# Patient Record
Sex: MALE | Race: BLACK OR AFRICAN AMERICAN
[De-identification: names, ages, dates, MRNs, and addresses within clinical notes are randomized per-mention and may not be internally consistent; named-entity substitution may affect disease eponyms.]

---

## 2024-01-15 ENCOUNTER — APPOINTMENT (OUTPATIENT)
Dept: MRI IMAGING | Facility: CLINIC | Age: 74
End: 2024-01-15
Payer: MEDICARE

## 2024-01-15 ENCOUNTER — APPOINTMENT (OUTPATIENT)
Dept: ORTHOPEDIC SURGERY | Facility: CLINIC | Age: 74
End: 2024-01-15
Payer: MEDICARE

## 2024-01-15 VITALS — WEIGHT: 260 LBS | HEIGHT: 75 IN | BODY MASS INDEX: 32.33 KG/M2

## 2024-01-15 DIAGNOSIS — Z78.9 OTHER SPECIFIED HEALTH STATUS: ICD-10-CM

## 2024-01-15 PROBLEM — Z00.00 ENCOUNTER FOR PREVENTIVE HEALTH EXAMINATION: Status: ACTIVE | Noted: 2024-01-15

## 2024-01-15 PROCEDURE — 72100 X-RAY EXAM L-S SPINE 2/3 VWS: CPT

## 2024-01-15 PROCEDURE — 99203 OFFICE O/P NEW LOW 30 MIN: CPT

## 2024-01-15 PROCEDURE — 72148 MRI LUMBAR SPINE W/O DYE: CPT | Mod: MH

## 2024-01-15 PROCEDURE — 73502 X-RAY EXAM HIP UNI 2-3 VIEWS: CPT

## 2024-01-15 NOTE — ASSESSMENT
[FreeTextEntry1] : Lumbosacral degenerative disc disease with radiculopathy Left hip osteoarthritis  Plan the patient can use  Advil with GI precautions Tylenol and heating pad.  I am also recommend a course of physical therapy.  I also discussed with the patient this time about an MRI of his lumbar spine he does have evidence of sciatic type complaints.  I did advise him to start the physical therapy has been doing it and he can continue it.  I also advised him at this time he should follow-up with pain management after the MRI for possible injection.  He does have pain in his hip and is limping on exam I did advise him some that is coming from his arthritis in the hip he may be a candidate for an injection in the future.  At this point his pain seems to be in his leg coming from his low back.  Symptoms of Cauda equina were discussed with the patient. Patient was advised if they develop any numbness or tingling down the legs, weakness, or bowel or bladder symptoms they must call the office or go to the ER immediately.  This medical record was created utilizing Dragon voice recognition software.  This software is not perfect and may occasionally create typographical errors which may be reflected in the above medical record.

## 2024-01-15 NOTE — IMAGING
[de-identified] : He is alert and oriented vital signs are stable.  He is able to stand on his toes and stand on his heels  Examination lumbar sacral spine reveals some tenderness in the left paraspinal region buttocks region with no evidence for spasm.  He is able to forward flex to 50 degrees extension of the spine of 15 degrees lateral rotation to the left and right of 20 degrees and lateral flexion to right left to 20 degrees.  He had 5-5 strength bilateral lower extremities 2+ deep tendon reflexes throughout negative straight leg raising  Examination left hip reveals no tenderness around the hip and groin region.  There is restricted range of motion on flexion and internal/external rotation.  He had a normal neurologic exam normal vascular exam normal skin exam.  There is no evidence of open wounds infection or compartment syndrome. [FreeTextEntry1] : X-rays lumbar spine reveals disc base narrowing noted L4-5 and L5-S1 with spondylolisthesis of L4 and L5. [de-identified] : X-rays left hip reveal severe osteoarthritis of the left hip with bone-on-bone.  He has some mild osteoarthritis of the right hip
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
## 2024-01-22 ENCOUNTER — APPOINTMENT (OUTPATIENT)
Dept: ORTHOPEDIC SURGERY | Facility: CLINIC | Age: 74
End: 2024-01-22
Payer: MEDICARE

## 2024-01-22 DIAGNOSIS — M47.26 OTHER SPONDYLOSIS WITH RADICULOPATHY, LUMBAR REGION: ICD-10-CM

## 2024-01-22 PROCEDURE — 99213 OFFICE O/P EST LOW 20 MIN: CPT
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
## 2024-01-22 NOTE — ASSESSMENT
[FreeTextEntry1] : Lumbosacral degenerative disc disease/HNP with radiculopathy Left hip osteoarthritis  Plan the patient can use  Advil with GI precautions Tylenol and heating pad.  I am also recommend a course of physical therapy.    The MRI was reviewed with the patient.  He is given a copy the report.  He does have evidence for herniated disks causing the numbness in his left leg.  At this point recommending he do the physical therapy but also see pain management for consultation.  He will see the pain manage specialist about a possible epidural injection.  He says he has an appointment on January 29 in [place] with the pain management specialist.  For the left hip he does have arthritis in the left hip and recommend he see one of the hip specialist Dr. Prather or Dr. Walker for further consultation.  All questions were answered.  Symptoms of Cauda equina were discussed with the patient. Patient was advised if they develop any numbness or tingling down the legs, weakness, or bowel or bladder symptoms they must call the office or go to the ER immediately.  This medical record was created utilizing Dragon voice recognition software.  This software is not perfect and may occasionally create typographical errors which may be reflected in the above medical record.

## 2024-01-22 NOTE — IMAGING
[de-identified] : He is alert and oriented vital signs are stable.  He is able to stand on his toes and stand on his heels  Examination lumbar sacral spine reveals some tenderness in the left paraspinal region buttocks region with no evidence for spasm.  He is able to forward flex to 50 degrees extension of the spine of 15 degrees lateral rotation to the left and right of 20 degrees and lateral flexion to right left to 20 degrees.  He had 5-5 strength bilateral lower extremities 2+ deep tendon reflexes throughout negative straight leg raising  Examination left hip reveals no tenderness around the hip and groin region.  There is restricted range of motion on flexion and internal/external rotation.  He had a normal neurologic exam normal vascular exam normal skin exam.  There is no evidence of open wounds infection or compartment syndrome. [FreeTextEntry1] : X-rays lumbar spine reveals disc base narrowing noted L4-5 and L5-S1 with spondylolisthesis of L4 and L5. [de-identified] : X-rays left hip reveal severe osteoarthritis of the left hip with bone-on-bone.  He has some mild osteoarthritis of the right hip

## 2024-01-22 NOTE — DATA REVIEWED
[MRI] : MRI [Lumbar Spine] : lumbar spine [Report was reviewed and noted in the chart] : The report was reviewed and noted in the chart [FreeTextEntry1] : MRI lumbar spine reveals hemangioma at L3.  There is also a herniated disc with inferior left foraminal stenosis.  The herniated disc with broad asymmetric to the left at L4-5.  There is a herniated disc also at centrally at L5-S1.  There is a stress reaction of the posterior superior L5 area.

## 2024-01-29 ENCOUNTER — APPOINTMENT (OUTPATIENT)
Dept: PAIN MANAGEMENT | Facility: CLINIC | Age: 74
End: 2024-01-29
Payer: MEDICARE

## 2024-01-29 VITALS — WEIGHT: 260 LBS | HEIGHT: 75 IN | BODY MASS INDEX: 32.33 KG/M2

## 2024-01-29 PROCEDURE — 99244 OFF/OP CNSLTJ NEW/EST MOD 40: CPT

## 2024-01-29 PROCEDURE — 99214 OFFICE O/P EST MOD 30 MIN: CPT

## 2024-01-29 NOTE — DISCUSSION/SUMMARY
[de-identified] : After discussing various treatment options with the patient including but not limited to oral medications, physical therapy, exercise modalities as well as interventional spinal injections, we have decided with the following plan:  - Continue Home exercises, stretching, activity modification, physical therapy, and conservative care. - MRI report and/or images was reviewed and discussed with the patient. - Recommend Left L4-5, L5-S1 Transforaminal Epidural Steroid Injection under fluoroscopic guidance with image. - The risks, benefits and alternatives of the proposed procedure were explained in detail with the patient. The risks outlined include but are not limited to infection, bleeding, post-dural puncture headache, nerve injury, a temporary increase in pain, failure to resolve symptoms, allergic reaction, symptom recurrence, and possible elevation of blood sugar in diabetics. All questions were answered to patient's apparent satisfaction and he/she verbalized an understanding. - Patient is presenting with acute/sub-acute radicular pain with impairment in ADLs and functionality.  The pain has not responded to conservative care including NSAID therapy and/or physical therapy.  There is no bleeding tendency, unstable medical condition, or systemic infection. - Follow up in 1-2 weeks post injection for re-evaluation.

## 2024-01-29 NOTE — PHYSICAL EXAM
[de-identified] : Constitutional; Appears well, no apparent distress Ability to communicate: Normal  Respiratory: non-labored breathing Skin: No rash noted Head: Normocephalic, atraumatic Neck: no visible thyroid enlargement Eyes: Extraocular movements intact Neurologic: Alert and oriented x3 Psychiatric: normal mood, affect and behavior [] : mildly antalgic

## 2024-01-29 NOTE — HISTORY OF PRESENT ILLNESS
[Lower back] : lower back [7] : 7 [Dull/Aching] : dull/aching [Intermittent] : intermittent [Meds] : meds [Nothing helps with pain getting better] : Nothing helps with pain getting better [Walking] : walking [FreeTextEntry1] : Initial HPI 01/29/2024: Pain started over a year ago and is on the LEFT side of the lower back and radiates into the left lateral buttock and down the left lateral thigh and lower leg to the toes described as numbness pain with associated weakness.   MRI Lumbar Spine 1/15/24 independently reviewed: L4-5 left HNP with moderate central stenosis; L5-S1 HNP, left NF stenosis and moderate/severe central stenosis.  Conservative Care: has done PT with little relief  Pain Medications: Motrin/Advil PRN  Past Injections: none Spine surgery: none  Blood thinners: none [] : no [FreeTextEntry6] : Numb [FreeTextEntry7] : rt hip, groin, right leg [FreeTextEntry8] : taking a step, putting weight

## 2024-02-08 ENCOUNTER — APPOINTMENT (OUTPATIENT)
Dept: ORTHOPEDIC SURGERY | Facility: CLINIC | Age: 74
End: 2024-02-08
Payer: MEDICARE

## 2024-02-08 VITALS — HEIGHT: 75 IN | BODY MASS INDEX: 32.33 KG/M2 | WEIGHT: 260 LBS

## 2024-02-08 DIAGNOSIS — M54.42 LUMBAGO WITH SCIATICA, LEFT SIDE: ICD-10-CM

## 2024-02-08 PROCEDURE — ZZZZZ: CPT
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
## 2024-02-13 ENCOUNTER — APPOINTMENT (OUTPATIENT)
Dept: PAIN MANAGEMENT | Facility: CLINIC | Age: 74
End: 2024-02-13

## 2024-03-05 ENCOUNTER — APPOINTMENT (OUTPATIENT)
Dept: PAIN MANAGEMENT | Facility: CLINIC | Age: 74
End: 2024-03-05
Payer: MEDICARE

## 2024-03-05 PROCEDURE — 64483 NJX AA&/STRD TFRM EPI L/S 1: CPT | Mod: LT

## 2024-03-05 PROCEDURE — J3490M: CUSTOM

## 2024-03-05 PROCEDURE — 64484 NJX AA&/STRD TFRM EPI L/S EA: CPT | Mod: 59,LT

## 2024-03-05 NOTE — PROCEDURE
[FreeTextEntry3] : Date of Service: 03/05/2024   Account: [id number]  Patient: FARA SMALL   YOB: 1950  Age: 74 year   Surgeon:                                                        Vijay Khan D.O.  Assistant:                                                        None  Pre-Operative Diagnosis:                            Lumbosacral radiculitis (M54.17)  Post-Operative Diagnosis:                          Same  Procedure:                                                     Left L4-5, L5-S1, transforaminal epidural steroid injection under fluoroscopic guidance.  Anesthesia:                                                    Local with MAC  This procedure was carried out using fluoroscopic guidance.  The risks and benefits of the procedure were discussed extensively with the patient.  The consent of the patient was obtained and the following procedure was performed. The patient was placed in the prone position on the fluoroscopic table and the lumbar area was prepped and draped in a sterile fashion. A timeout was performed with all essential staff present and the site and side were verified.  The Left L4-5, L5-S1 neural foramen were identified on left oblique "william dog" anatomical view at the 6 o'clock position using fluoroscopic guidance, and the area was marked. The overlying skin and subcutaneous structures were anesthetized using sterile technique with 1% Lidocaine.  A 22-gauge spinal needle was directed toward the inferior (6 o'clock) position of the pedicle, which formed the roof of the identified foramen.  Once in the epidural space, after negative aspiration for heme and CSF, 1cc of Omnipaque contrast was injected under live fluoroscopy to confirm epidural location and assess filling defects and rule out intravascular needle placement.   The following contrast flow and epidurogram was observed: no intravascular or intrathecal flow pattern was noted.  No blood or CSF was aspirated. Contrast spread appeared to outline the left L4 and L5 nerve roots and spread medially into the epidural space.    After this, 3 ml of a total mixture of 12 mg betamethasone, 2 ml of preservative free normal saline, and 2 ml of 0.25% bupivacaine was administered without any resistance in the epidural space at each of the two levels.   The needle was subsequently removed.  Vital signs remained normal.  Pulse oximeter was used throughout the procedure and the patient's pulse and oxygen saturation remained within normal limits.  The patient tolerated the procedure well.  There were no complications.  The patient was instructed to apply ice over the injection sites for twenty minutes every two hours for the next 24 to 48 hours.  The patient was also instructed to contact me immediately if there were any problems.  Vijay Khan D.O.

## 2024-03-18 ENCOUNTER — APPOINTMENT (OUTPATIENT)
Dept: PAIN MANAGEMENT | Facility: CLINIC | Age: 74
End: 2024-03-18
Payer: MEDICARE

## 2024-03-18 VITALS — BODY MASS INDEX: 28.62 KG/M2 | HEIGHT: 76 IN | WEIGHT: 235 LBS

## 2024-03-18 DIAGNOSIS — M16.12 UNILATERAL PRIMARY OSTEOARTHRITIS, LEFT HIP: ICD-10-CM

## 2024-03-18 PROCEDURE — 99213 OFFICE O/P EST LOW 20 MIN: CPT

## 2024-03-18 NOTE — HISTORY OF PRESENT ILLNESS
[Lower back] : lower back [Dull/Aching] : dull/aching [Intermittent] : intermittent [Meds] : meds [Nothing helps with pain getting better] : Nothing helps with pain getting better [Walking] : walking [6] : 6 [FreeTextEntry1] : 03/18/2024: s/p Left L4-5 L5-S1 TFESI on 03/05/24 with 50% relief and improvement of ADLS. Pain still radiating down the leg but much less severe. Reports less weakness, but still experiencing some pain and numbness.  Saw Dr. Walker who recommended LEFT hip injection. Hip X-rays reviewed showing advanced OA.   Initial HPI 01/29/2024: Pain started over a year ago and is on the LEFT side of the lower back and radiates into the left lateral buttock and down the left lateral thigh and lower leg to the toes described as numbness pain with associated weakness.   MRI Lumbar Spine 1/15/24 independently reviewed: L4-5 left HNP with moderate central stenosis; L5-S1 HNP, left NF stenosis and moderate/severe central stenosis.  Conservative Care: has done PT with little relief  Pain Medications: Motrin/Advil PRN  Past Injections: none Spine surgery: none  Blood thinners: none [FreeTextEntry6] : Numb [] : no [FreeTextEntry8] : taking a step, putting weight  [FreeTextEntry7] : rt hip, groin, right leg

## 2024-03-18 NOTE — DISCUSSION/SUMMARY
[de-identified] : After discussing various treatment options with the patient including but not limited to oral medications, physical therapy, exercise modalities as well as interventional spinal injections, we have decided with the following plan:  - Continue Home exercises, stretching, activity modification, physical therapy, and conservative care. - Recommend LEFT hip injection under fluoroscopic guidance with image. - The risks, benefits and alternatives of the proposed procedure were explained in detail with the patient. The risks outlined include but are not limited to infection, bleeding, nerve injury, a temporary increase in pain, failure to resolve symptoms, allergic reaction, symptom recurrence, and possible elevation of blood sugar in diabetics. All questions were answered to patient's apparent satisfaction and he/she verbalized an understanding. - The pain has not responded to conservative care including NSAID therapy and/or physical therapy.  There is no bleeding tendency, unstable medical condition, or systemic infection. - Follow up in 1-2 weeks post injection for re-evaluation.

## 2024-03-18 NOTE — PHYSICAL EXAM
[de-identified] : Constitutional; Appears well, no apparent distress Ability to communicate: Normal  Respiratory: non-labored breathing Skin: No rash noted Head: Normocephalic, atraumatic Neck: no visible thyroid enlargement Eyes: Extraocular movements intact Neurologic: Alert and oriented x3 Psychiatric: normal mood, affect and behavior Left Hip: +FADIR  [] : negative sitting straight leg raise

## 2024-04-02 ENCOUNTER — APPOINTMENT (OUTPATIENT)
Dept: PAIN MANAGEMENT | Facility: CLINIC | Age: 74
End: 2024-04-02

## 2024-04-18 ENCOUNTER — APPOINTMENT (OUTPATIENT)
Dept: PAIN MANAGEMENT | Facility: CLINIC | Age: 74
End: 2024-04-18

## 2024-05-07 ENCOUNTER — APPOINTMENT (OUTPATIENT)
Dept: PAIN MANAGEMENT | Facility: CLINIC | Age: 74
End: 2024-05-07
Payer: MEDICARE

## 2024-05-07 PROCEDURE — 77002 NEEDLE LOCALIZATION BY XRAY: CPT | Mod: 59

## 2024-05-07 PROCEDURE — J3490M: CUSTOM

## 2024-05-07 PROCEDURE — 27093 INJECTION FOR HIP X-RAY: CPT | Mod: LT

## 2024-05-07 PROCEDURE — 73525 CONTRAST X-RAY OF HIP: CPT | Mod: 26,59

## 2024-05-07 NOTE — PROCEDURE
[FreeTextEntry3] : Date of Service: 05/07/2024   Account: [id number]  Patient: FARA SMALL   YOB: 1950  Age: 74 year   Surgeon:                                                          Vijay Khan D.O.   Pre-Operative Diagnosis:                              Unilateral primary osteoarthritis, left hip  Post-Operative Diagnosis:                            Same  Procedure:                                                       Left Hip arthrogram and steroid injection under fluoroscopic guidance                                                 This procedure was carried out using fluoroscopic guidance.  The risks and benefits of the procedure were discussed extensively with the patient.  The consent of the patient was obtained and the following procedure was performed.  The patient was placed in the supine position with left hip flexed and externally rotated 25 degrees.  The area of the left groin was prepped and draped in a sterile fashion. A timeout was performed with all essential staff present and the site and side were verified. The fluoroscopic image intensifier was then positioned so that the left hip appeared in view, and the midline intertrochanteric region was identified and marked. The skin and subcutaneous structures were then anesthetized using 1 cc of 1% lidocaine.  A 22-gauge spinal needle was then inserted and directed into this left hip intra-capsular region.  After negative aspiration for heme and CSF, 3 cc of Omnipaque contrast was injected under live fluoroscopy and appeared to fill the joint margins.   Left hip arthrogram showed no intravascular flow, and good spread around the femoral head and to the acetabulum. An injectate of 3 cc 0.25% bupivacaine plus 6 mg of betamethasone was then injected into the left hip space.  The needle was then removed and pressure was applied.  Anesthesia personnel were present throughout the procedure. The patient was instructed to apply ice over the injection site for twenty minutes every two hours for the next 24 to 48 hours.  The patient was also instructed to contact me immediately if there were any problems.  Vijay Khan D.O.

## 2024-05-20 ENCOUNTER — APPOINTMENT (OUTPATIENT)
Dept: PAIN MANAGEMENT | Facility: CLINIC | Age: 74
End: 2024-05-20
Payer: MEDICARE

## 2024-05-20 VITALS — BODY MASS INDEX: 29.22 KG/M2 | HEIGHT: 75 IN | WEIGHT: 235 LBS

## 2024-05-20 DIAGNOSIS — M16.12 UNILATERAL PRIMARY OSTEOARTHRITIS, LEFT HIP: ICD-10-CM

## 2024-05-20 PROCEDURE — 99213 OFFICE O/P EST LOW 20 MIN: CPT

## 2024-05-20 NOTE — DISCUSSION/SUMMARY
[de-identified] : After discussing various treatment options with the patient including but not limited to oral medications, physical therapy, exercise modalities as well as interventional spinal injections, we have decided with the following plan:  - Continue Home exercises, stretching, activity modification, physical therapy, and conservative care. - MRI report and/or images was reviewed and discussed with the patient. - Recommend L4-5 Lumbar Epidural Steroid Injection under fluoroscopic guidance with image. - The risks, benefits and alternatives of the proposed procedure were explained in detail with the patient. The risks outlined include but are not limited to infection, bleeding, post-dural puncture headache, nerve injury, a temporary increase in pain, failure to resolve symptoms, allergic reaction, symptom recurrence, and possible elevation of blood sugar in diabetics. All questions were answered to patient's apparent satisfaction and he/she verbalized an understanding. - Patient is presenting with acute/sub-acute radicular pain with impairment in ADLs and functionality.  The pain has not responded to conservative care including NSAID therapy and/or physical therapy.  There is no bleeding tendency, unstable medical condition, or systemic infection. - Follow up in 1-2 weeks post injection for re-evaluation.

## 2024-05-20 NOTE — PHYSICAL EXAM
[de-identified] : Constitutional; Appears well, no apparent distress Ability to communicate: Normal  Respiratory: non-labored breathing Skin: No rash noted Head: Normocephalic, atraumatic Neck: no visible thyroid enlargement Eyes: Extraocular movements intact Neurologic: Alert and oriented x3 Psychiatric: normal mood, affect and behavior Left Hip: +FADIR  [] : negative sitting straight leg raise

## 2024-05-20 NOTE — HISTORY OF PRESENT ILLNESS
[Lower back] : lower back [6] : 6 [Dull/Aching] : dull/aching [Intermittent] : intermittent [Meds] : meds [Nothing helps with pain getting better] : Nothing helps with pain getting better [Walking] : walking [FreeTextEntry1] : 05/20/2024: s/p LT HIP INJ on 05/07/24 with 50% relief and improvement of ADLs. Able to walk better but still needs a cane. Pain radiating down to the knee and lower leg to the ankle.   03/18/2024: s/p Left L4-5 L5-S1 TFESI on 03/05/24 with 50% relief and improvement of ADLS. Pain still radiating down the leg but much less severe. Reports less weakness, but still experiencing some pain and numbness.  Saw Dr. Walker who recommended LEFT hip injection. Hip X-rays reviewed showing advanced OA.   Initial HPI 01/29/2024: Pain started over a year ago and is on the LEFT side of the lower back and radiates into the left lateral buttock and down the left lateral thigh and lower leg to the toes described as numbness pain with associated weakness.   MRI Lumbar Spine 1/15/24 independently reviewed: L4-5 left HNP with moderate central stenosis; L5-S1 HNP, left NF stenosis and moderate/severe central stenosis.  Conservative Care: has done PT with little relief  Pain Medications: Motrin/Advil PRN  Past Injections: none Spine surgery: none  Blood thinners: none [] : no [FreeTextEntry6] : Numb [FreeTextEntry7] : rt hip, groin, right leg [FreeTextEntry8] : taking a step, putting weight  [TWNoteComboBox1] : 50%

## 2024-06-04 ENCOUNTER — APPOINTMENT (OUTPATIENT)
Dept: PAIN MANAGEMENT | Facility: CLINIC | Age: 74
End: 2024-06-04
Payer: MEDICARE

## 2024-06-04 PROCEDURE — 62323 NJX INTERLAMINAR LMBR/SAC: CPT

## 2024-06-04 NOTE — PROCEDURE
[FreeTextEntry3] : Date of Service: 06/04/2024   Account: [id number]  Patient: FARA SMALL   YOB: 1950  Age: 74 year   Surgeon:                                                         Vijay Khan D.O.  Pre-Operative Diagnosis:                             Lumbosacral radiculitis  Post-Operative Diagnosis:                           Same  Procedure:                                                      Interlaminar lumbar epidural steroid injection (L4-5) under fluoroscopic guidance  Anesthesia:                                                     Local with MAC   This procedure was carried out using fluoroscopic guidance.  The risks and benefits of the procedure were discussed extensively with the patient.  The consent of the patient was obtained and the following procedure was performed.  The patient was placed in the prone position.  The lumbar area was prepped and draped in a sterile fashion.  A timeout was performed with all essential staff present and the site and side were verified. Under AP view with slight cephalad-caudad angulation, the L4-5 interspace was identified and marked.  Using sterile technique, the superficial skin was anesthetized with 1% Lidocaine without epinephrine.  A 20-gauge Tuohy needle was advanced into the epidural space under fluoroscopy using [id number] technique and using loss of resistance at the L4-5 level.  After negative aspiration for heme or CSF, an epidurogram was obtained using 2-3 cc Omnipaque contrast injected under live fluoroscopy, confirming epidural placement of the needle.    Epidurogram showed no evidence of intrathecal or intravascular flow, and good evidence of bilateral epidural flow from L3-S2 levels.  After this, 4 cc of preservative free normal saline plus 12 mg of betamethasone were injected into the epidural space.  The needle was subsequently removed.  Anesthesia personnel were present throughout the procedure.  The patient tolerated the procedure well and was instructed to contact me immediately if there were any problems.  Vijay Khan D.O.

## 2024-06-17 ENCOUNTER — APPOINTMENT (OUTPATIENT)
Dept: PAIN MANAGEMENT | Facility: CLINIC | Age: 74
End: 2024-06-17
Payer: MEDICARE

## 2024-06-17 VITALS — WEIGHT: 235 LBS | BODY MASS INDEX: 29.22 KG/M2 | HEIGHT: 75 IN

## 2024-06-17 DIAGNOSIS — M54.50 LOW BACK PAIN, UNSPECIFIED: ICD-10-CM

## 2024-06-17 DIAGNOSIS — M54.17 RADICULOPATHY, LUMBOSACRAL REGION: ICD-10-CM

## 2024-06-17 PROCEDURE — 99213 OFFICE O/P EST LOW 20 MIN: CPT

## 2024-06-17 RX ORDER — MELOXICAM 15 MG/1
15 TABLET ORAL
Qty: 30 | Refills: 1 | Status: ACTIVE | COMMUNITY
Start: 2024-06-17 | End: 1900-01-01

## 2024-06-17 NOTE — DISCUSSION/SUMMARY
[de-identified] : After discussing various treatment options with the patient including but not limited to oral medications, physical therapy, exercise modalities as well as interventional spinal injections, we have decided with the following plan:  - Continue home exercises, stretching, activity modification, physical therapy, and conservative care. - Follow-up as needed. - Recommend Meloxicam 15mg daily PRN. Potential adverse effects including but not limited to bleeding, ulcers, increased risk of hypertension, heart disease, kidney disease and stroke were discussed with the patient.  Medication would allow patient to be more mobile and perform ADL's.  Will continue to monitor patient and attempt to wean as soon as possible. Will use the lowest dosage possible for the shortest possible period of time.

## 2024-06-17 NOTE — PHYSICAL EXAM
[de-identified] : Constitutional; Appears well, no apparent distress Ability to communicate: Normal  Respiratory: non-labored breathing Skin: No rash noted Head: Normocephalic, atraumatic Neck: no visible thyroid enlargement Eyes: Extraocular movements intact Neurologic: Alert and oriented x3 Psychiatric: normal mood, affect and behavior Left Hip: +FADIR  [] : negative sitting straight leg raise

## 2024-06-17 NOTE — HISTORY OF PRESENT ILLNESS
[Lower back] : lower back [6] : 6 [Dull/Aching] : dull/aching [Intermittent] : intermittent [Meds] : meds [Nothing helps with pain getting better] : Nothing helps with pain getting better [Walking] : walking [FreeTextEntry1] : 06/17/2024: s/p L4-5 LESI on 06/04/24 with 50% relief and improvement of ADLs. Pain was good for a few days and then started to return.   05/20/2024: s/p LT HIP INJ on 05/07/24 with 50% relief and improvement of ADLs. Able to walk better but still needs a cane. Pain radiating down to the knee and lower leg to the ankle.   03/18/2024: s/p Left L4-5 L5-S1 TFESI on 03/05/24 with 50% relief and improvement of ADLS. Pain still radiating down the leg but much less severe. Reports less weakness, but still experiencing some pain and numbness.  Saw Dr. Walker who recommended LEFT hip injection. Hip X-rays reviewed showing advanced OA.   Initial HPI 01/29/2024: Pain started over a year ago and is on the LEFT side of the lower back and radiates into the left lateral buttock and down the left lateral thigh and lower leg to the toes described as numbness pain with associated weakness.   MRI Lumbar Spine 1/15/24 independently reviewed: L4-5 left HNP with moderate central stenosis; L5-S1 HNP, left NF stenosis and moderate/severe central stenosis.  Conservative Care: has done PT with little relief  Pain Medications: Motrin/Advil PRN  Past Injections: none Spine surgery: none  Blood thinners: none occupation:   [] : no [FreeTextEntry6] : Numb [FreeTextEntry7] : rt hip, groin, right leg [FreeTextEntry8] : taking a step, putting weight

## 2024-09-17 ENCOUNTER — APPOINTMENT (OUTPATIENT)
Dept: PAIN MANAGEMENT | Facility: CLINIC | Age: 74
End: 2024-09-17
Payer: MEDICARE

## 2024-09-17 VITALS — BODY MASS INDEX: 29.22 KG/M2 | WEIGHT: 235 LBS | HEIGHT: 75 IN

## 2024-09-17 DIAGNOSIS — M54.17 RADICULOPATHY, LUMBOSACRAL REGION: ICD-10-CM

## 2024-09-17 DIAGNOSIS — M54.50 LOW BACK PAIN, UNSPECIFIED: ICD-10-CM

## 2024-09-17 PROCEDURE — 99213 OFFICE O/P EST LOW 20 MIN: CPT

## 2024-09-17 NOTE — DISCUSSION/SUMMARY
[de-identified] : After discussing various treatment options with the patient including but not limited to oral medications, physical therapy, exercise modalities as well as interventional spinal injections, we have decided with the following plan:  - Continue Home exercises, stretching, activity modification, physical therapy, and conservative care. - MRI report and/or images was reviewed and discussed with the patient. - Recommend Left L4-5, L5-S1 Transforaminal Epidural Steroid Injection under fluoroscopic guidance with image.  - Recommend LEFT hip injection under fluoroscopic guidance with image. - The risks, benefits and alternatives of the proposed procedure were explained in detail with the patient. The risks outlined include but are not limited to infection, bleeding, post-dural puncture headache, nerve injury, a temporary increase in pain, failure to resolve symptoms, allergic reaction, symptom recurrence, and possible elevation of blood sugar in diabetics. All questions were answered to patient's apparent satisfaction and he/she verbalized an understanding. - Patient is presenting with acute/sub-acute radicular pain with impairment in ADLs and functionality.  The pain has not responded to conservative care including NSAID therapy and/or physical therapy.  There is no bleeding tendency, unstable medical condition, or systemic infection. - Follow up in 1-2 weeks post injection for re-evaluation.

## 2024-09-17 NOTE — HISTORY OF PRESENT ILLNESS
[Lower back] : lower back [Left Leg] : left leg [6] : 6 [Dull/Aching] : dull/aching [Intermittent] : intermittent [Meds] : meds [Nothing helps with pain getting better] : Nothing helps with pain getting better [Walking] : walking [] : no [FreeTextEntry6] : Numb [FreeTextEntry7] : rt hip, groin, right leg [FreeTextEntry8] : taking a step, putting weight  [FreeTextEntry1] : 9/17/24: Pain is on the left buttock and lateral hip and radiating into the left groin and down the leg to the ankle.   06/17/2024: s/p L4-5 LESI on 06/04/24 with 50% relief and improvement of ADLs. Pain was good for a few days and then started to return.   05/20/2024: s/p LT HIP INJ on 05/07/24 with 50% relief and improvement of ADLs. Able to walk better but still needs a cane. Pain radiating down to the knee and lower leg to the ankle.   03/18/2024: s/p Left L4-5 L5-S1 TFESI on 03/05/24 with 50% relief and improvement of ADLS. Pain still radiating down the leg but much less severe. Reports less weakness, but still experiencing some pain and numbness.  Saw Dr. Walker who recommended LEFT hip injection. Hip X-rays reviewed showing advanced OA.   Initial HPI 01/29/2024: Pain started over a year ago and is on the LEFT side of the lower back and radiates into the left lateral buttock and down the left lateral thigh and lower leg to the toes described as numbness pain with associated weakness.   MRI Lumbar Spine 1/15/24 independently reviewed: L4-5 left HNP with moderate central stenosis; L5-S1 HNP, left NF stenosis and moderate/severe central stenosis.  Conservative Care: has done PT with little relief  Pain Medications: Motrin/Advil PRN  Past Injections: none Spine surgery: none  Blood thinners: none occupation:

## 2024-09-17 NOTE — PHYSICAL EXAM
[de-identified] : Constitutional; Appears well, no apparent distress Ability to communicate: Normal  Respiratory: non-labored breathing Skin: No rash noted Head: Normocephalic, atraumatic Neck: no visible thyroid enlargement Eyes: Extraocular movements intact Neurologic: Alert and oriented x3 Psychiatric: normal mood, affect and behavior Left Hip: +FADIR  [] : negative sitting straight leg raise

## 2024-10-01 ENCOUNTER — APPOINTMENT (OUTPATIENT)
Dept: PAIN MANAGEMENT | Facility: CLINIC | Age: 74
End: 2024-10-01
Payer: MEDICARE

## 2024-10-01 DIAGNOSIS — M54.17 RADICULOPATHY, LUMBOSACRAL REGION: ICD-10-CM

## 2024-10-01 PROCEDURE — 64483 NJX AA&/STRD TFRM EPI L/S 1: CPT | Mod: LT

## 2024-10-01 PROCEDURE — 64484 NJX AA&/STRD TFRM EPI L/S EA: CPT | Mod: LT,59

## 2024-10-01 PROCEDURE — J3490M: CUSTOM

## 2024-10-01 NOTE — PROCEDURE
[FreeTextEntry3] : Date of Service: 10/01/2024   Account: [id number]  Patient: FARA SMALL   YOB: 1950  Age: 74 year   Surgeon:                                                        Vijay Khan D.O.  Assistant:                                                        None  Pre-Operative Diagnosis:                            Lumbosacral radiculitis (M54.17)  Post-Operative Diagnosis:                          Same  Procedure:                                                     Left L4-5, L5-S1, transforaminal epidural steroid injection under fluoroscopic guidance.  Anesthesia:                                                    Local with MAC  This procedure was carried out using fluoroscopic guidance.  The risks and benefits of the procedure were discussed extensively with the patient.  The consent of the patient was obtained and the following procedure was performed. The patient was placed in the prone position on the fluoroscopic table and the lumbar area was prepped and draped in a sterile fashion. A timeout was performed with all essential staff present and the site and side were verified.  The Left L4-5, L5-S1 neural foramen were identified on left oblique "william dog" anatomical view at the 6 o'clock position using fluoroscopic guidance, and the area was marked. The overlying skin and subcutaneous structures were anesthetized using sterile technique with 1% Lidocaine.  A 22-gauge spinal needle was directed toward the inferior (6 o'clock) position of the pedicle, which formed the roof of the identified foramen.  Once in the epidural space, after negative aspiration for heme and CSF, 1cc of Omnipaque contrast was injected under live fluoroscopy to confirm epidural location and assess filling defects and rule out intravascular needle placement.   The following contrast flow and epidurogram was observed: no intravascular or intrathecal flow pattern was noted.  No blood or CSF was aspirated. Contrast spread appeared to outline the left L4 and L5 nerve roots and spread medially into the epidural space.    After this, 3 ml of a total mixture of 12 mg betamethasone, 2 ml of preservative free normal saline, and 2 ml of 0.25% bupivacaine was administered without any resistance in the epidural space at each of the two levels.   The needle was subsequently removed.  Vital signs remained normal.  Pulse oximeter was used throughout the procedure and the patient's pulse and oxygen saturation remained within normal limits.  The patient tolerated the procedure well.  There were no complications.  The patient was instructed to apply ice over the injection sites for twenty minutes every two hours for the next 24 to 48 hours.  The patient was also instructed to contact me immediately if there were any problems.  Vijay Khan D.O.

## 2024-10-08 ENCOUNTER — APPOINTMENT (OUTPATIENT)
Dept: PAIN MANAGEMENT | Facility: CLINIC | Age: 74
End: 2024-10-08
Payer: MEDICARE

## 2024-10-08 DIAGNOSIS — M16.12 UNILATERAL PRIMARY OSTEOARTHRITIS, LEFT HIP: ICD-10-CM

## 2024-10-08 PROCEDURE — 73525 CONTRAST X-RAY OF HIP: CPT | Mod: 26,59

## 2024-10-08 PROCEDURE — 27093 INJECTION FOR HIP X-RAY: CPT | Mod: LT

## 2024-10-08 PROCEDURE — 77002 NEEDLE LOCALIZATION BY XRAY: CPT | Mod: 59

## 2024-10-08 PROCEDURE — J3490M: CUSTOM

## 2024-10-21 ENCOUNTER — APPOINTMENT (OUTPATIENT)
Dept: PAIN MANAGEMENT | Facility: CLINIC | Age: 74
End: 2024-10-21
Payer: MEDICARE

## 2024-10-21 VITALS — HEIGHT: 75 IN | WEIGHT: 237 LBS | BODY MASS INDEX: 29.47 KG/M2

## 2024-10-21 DIAGNOSIS — M54.50 LOW BACK PAIN, UNSPECIFIED: ICD-10-CM

## 2024-10-21 DIAGNOSIS — M54.17 RADICULOPATHY, LUMBOSACRAL REGION: ICD-10-CM

## 2024-10-21 PROCEDURE — 99213 OFFICE O/P EST LOW 20 MIN: CPT

## 2024-11-12 ENCOUNTER — APPOINTMENT (OUTPATIENT)
Dept: ORTHOPEDIC SURGERY | Facility: CLINIC | Age: 74
End: 2024-11-12
Payer: MEDICARE

## 2024-11-12 VITALS — WEIGHT: 245 LBS | BODY MASS INDEX: 30.15 KG/M2 | HEIGHT: 75.5 IN

## 2024-11-12 DIAGNOSIS — M16.12 UNILATERAL PRIMARY OSTEOARTHRITIS, LEFT HIP: ICD-10-CM

## 2024-11-12 DIAGNOSIS — Z86.79 PERSONAL HISTORY OF OTHER DISEASES OF THE CIRCULATORY SYSTEM: ICD-10-CM

## 2024-11-12 PROCEDURE — 99214 OFFICE O/P EST MOD 30 MIN: CPT

## 2024-11-12 PROCEDURE — 73503 X-RAY EXAM HIP UNI 4/> VIEWS: CPT | Mod: LT

## 2024-11-12 PROCEDURE — G2211 COMPLEX E/M VISIT ADD ON: CPT

## 2024-11-12 RX ORDER — MELOXICAM 15 MG/1
15 TABLET ORAL
Qty: 30 | Refills: 2 | Status: ACTIVE | COMMUNITY
Start: 2024-11-12 | End: 1900-01-01

## 2024-11-21 ENCOUNTER — NON-APPOINTMENT (OUTPATIENT)
Age: 74
End: 2024-11-21

## 2024-12-26 ENCOUNTER — OUTPATIENT (OUTPATIENT)
Dept: OUTPATIENT SERVICES | Facility: HOSPITAL | Age: 74
LOS: 1 days | End: 2024-12-26
Payer: COMMERCIAL

## 2024-12-26 VITALS
OXYGEN SATURATION: 98 % | HEIGHT: 75 IN | HEART RATE: 70 BPM | DIASTOLIC BLOOD PRESSURE: 88 MMHG | TEMPERATURE: 98 F | SYSTOLIC BLOOD PRESSURE: 150 MMHG | RESPIRATION RATE: 18 BRPM | WEIGHT: 265 LBS

## 2024-12-26 DIAGNOSIS — M16.12 UNILATERAL PRIMARY OSTEOARTHRITIS, LEFT HIP: ICD-10-CM

## 2024-12-26 DIAGNOSIS — Z01.818 ENCOUNTER FOR OTHER PREPROCEDURAL EXAMINATION: ICD-10-CM

## 2024-12-26 LAB
A1C WITH ESTIMATED AVERAGE GLUCOSE RESULT: 5.7 % — HIGH (ref 4–5.6)
ALBUMIN SERPL ELPH-MCNC: 3.9 G/DL — SIGNIFICANT CHANGE UP (ref 3.3–5)
ALP SERPL-CCNC: 93 U/L — SIGNIFICANT CHANGE UP (ref 30–120)
ALT FLD-CCNC: 23 U/L — SIGNIFICANT CHANGE UP (ref 10–60)
ANION GAP SERPL CALC-SCNC: 8 MMOL/L — SIGNIFICANT CHANGE UP (ref 5–17)
APTT BLD: 30.9 SEC — SIGNIFICANT CHANGE UP (ref 24.5–35.6)
AST SERPL-CCNC: 19 U/L — SIGNIFICANT CHANGE UP (ref 10–40)
BILIRUB SERPL-MCNC: 0.6 MG/DL — SIGNIFICANT CHANGE UP (ref 0.2–1.2)
BLD GP AB SCN SERPL QL: SIGNIFICANT CHANGE UP
BUN SERPL-MCNC: 16 MG/DL — SIGNIFICANT CHANGE UP (ref 7–23)
CALCIUM SERPL-MCNC: 9 MG/DL — SIGNIFICANT CHANGE UP (ref 8.4–10.5)
CHLORIDE SERPL-SCNC: 104 MMOL/L — SIGNIFICANT CHANGE UP (ref 96–108)
CO2 SERPL-SCNC: 28 MMOL/L — SIGNIFICANT CHANGE UP (ref 22–31)
CREAT SERPL-MCNC: 1.27 MG/DL — SIGNIFICANT CHANGE UP (ref 0.5–1.3)
EGFR: 60 ML/MIN/1.73M2 — SIGNIFICANT CHANGE UP
ESTIMATED AVERAGE GLUCOSE: 117 MG/DL — HIGH (ref 68–114)
GLUCOSE SERPL-MCNC: 94 MG/DL — SIGNIFICANT CHANGE UP (ref 70–99)
HCT VFR BLD CALC: 39.5 % — SIGNIFICANT CHANGE UP (ref 39–50)
HGB BLD-MCNC: 12.5 G/DL — LOW (ref 13–17)
INR BLD: 1.06 RATIO — SIGNIFICANT CHANGE UP (ref 0.85–1.16)
MCHC RBC-ENTMCNC: 28.7 PG — SIGNIFICANT CHANGE UP (ref 27–34)
MCHC RBC-ENTMCNC: 31.6 G/DL — LOW (ref 32–36)
MCV RBC AUTO: 90.8 FL — SIGNIFICANT CHANGE UP (ref 80–100)
NRBC # BLD: 0 /100 WBCS — SIGNIFICANT CHANGE UP (ref 0–0)
PLATELET # BLD AUTO: 191 K/UL — SIGNIFICANT CHANGE UP (ref 150–400)
POTASSIUM SERPL-MCNC: 3.8 MMOL/L — SIGNIFICANT CHANGE UP (ref 3.5–5.3)
POTASSIUM SERPL-SCNC: 3.8 MMOL/L — SIGNIFICANT CHANGE UP (ref 3.5–5.3)
PROT SERPL-MCNC: 8.4 G/DL — HIGH (ref 6–8.3)
PROTHROM AB SERPL-ACNC: 12.3 SEC — SIGNIFICANT CHANGE UP (ref 9.9–13.4)
RBC # BLD: 4.35 M/UL — SIGNIFICANT CHANGE UP (ref 4.2–5.8)
RBC # FLD: 13.7 % — SIGNIFICANT CHANGE UP (ref 10.3–14.5)
SODIUM SERPL-SCNC: 140 MMOL/L — SIGNIFICANT CHANGE UP (ref 135–145)
WBC # BLD: 4.8 K/UL — SIGNIFICANT CHANGE UP (ref 3.8–10.5)
WBC # FLD AUTO: 4.8 K/UL — SIGNIFICANT CHANGE UP (ref 3.8–10.5)

## 2024-12-26 PROCEDURE — 36415 COLL VENOUS BLD VENIPUNCTURE: CPT

## 2024-12-26 PROCEDURE — 93005 ELECTROCARDIOGRAM TRACING: CPT

## 2024-12-26 PROCEDURE — 93010 ELECTROCARDIOGRAM REPORT: CPT

## 2024-12-26 PROCEDURE — 86900 BLOOD TYPING SEROLOGIC ABO: CPT

## 2024-12-26 PROCEDURE — 83036 HEMOGLOBIN GLYCOSYLATED A1C: CPT

## 2024-12-26 PROCEDURE — 86901 BLOOD TYPING SEROLOGIC RH(D): CPT

## 2024-12-26 PROCEDURE — 85610 PROTHROMBIN TIME: CPT

## 2024-12-26 PROCEDURE — 87640 STAPH A DNA AMP PROBE: CPT

## 2024-12-26 PROCEDURE — 80053 COMPREHEN METABOLIC PANEL: CPT

## 2024-12-26 PROCEDURE — 85027 COMPLETE CBC AUTOMATED: CPT

## 2024-12-26 PROCEDURE — 87641 MR-STAPH DNA AMP PROBE: CPT

## 2024-12-26 PROCEDURE — 86850 RBC ANTIBODY SCREEN: CPT

## 2024-12-26 PROCEDURE — G0463: CPT

## 2024-12-26 PROCEDURE — 85730 THROMBOPLASTIN TIME PARTIAL: CPT

## 2024-12-26 RX ORDER — ACETAMINOPHEN 80 MG/.8ML
2 SOLUTION/ DROPS ORAL
Refills: 0 | DISCHARGE

## 2024-12-26 RX ORDER — IBUPROFEN 200 MG
1 TABLET ORAL
Refills: 0 | DISCHARGE

## 2024-12-26 NOTE — H&P PST ADULT - NSICDXFAMILYHX_GEN_ALL_CORE_FT
FAMILY HISTORY:  Mother  Still living? No  FH: diabetic complications, Age at diagnosis: Age Unknown

## 2024-12-26 NOTE — H&P PST ADULT - NSICDXPASTMEDICALHX_GEN_ALL_CORE_FT
PAST MEDICAL HISTORY:  Allergy to metal     Back pain     Elevated blood pressure reading     H/O eczema     Lumbosacral radiculitis     Neck pain     Obese     Osteoarthritis of left hip     Seasonal allergies

## 2024-12-26 NOTE — H&P PST ADULT - NEGATIVE OPHTHALMOLOGIC SYMPTOMS
use contact lens/no photophobia/no lacrimation L/no lacrimation R/no blurred vision L/no blurred vision R

## 2024-12-26 NOTE — H&P PST ADULT - HISTORY OF PRESENT ILLNESS
73 y/o male with h/o osteoarthritis presents with  complaining of left hip  pain that started a few  years ago. He complains  left-sided low back pain going to his left buttocks and down his left leg to his left ankle Patient states he has trouble walking long distances . Patient denies that he has any numbness or tingling. Patient is scheduled for Left total hip replacement on 01/15/2025

## 2024-12-26 NOTE — H&P PST ADULT - PROBLEM SELECTOR PLAN 1
75 y/o male with left hip  pain  scheduled surgery: Left total hip replacement  will obtain medical clearance  Patient provided with pre-operative instructions and verbalized understanding.   Patient will be NPO on day of surgery.   Patient will stop NSAIDs, aspirin, herbal supplements or vitamins 1 week prior to surgery.    Chlorhexidine wash  and Gatorade provided with instructions.

## 2024-12-26 NOTE — H&P PST ADULT - COMMENTS
denies broken loose teeth  denies h/oDVT,PE  denies any current cold or flu like symptoms, including fever, cough, sinus congestion, body aches or chills

## 2024-12-26 NOTE — H&P PST ADULT - NSANTHOSAYNRD_GEN_A_CORE
neck circ 19/No. ASUNCION screening performed.  STOP BANG Legend: 0-2 = LOW Risk; 3-4 = INTERMEDIATE Risk; 5-8 = HIGH Risk

## 2024-12-27 LAB
MRSA PCR RESULT.: DETECTED
S AUREUS DNA NOSE QL NAA+PROBE: DETECTED

## 2024-12-27 RX ORDER — MUPIROCIN 2 %
1 OINTMENT (GRAM) TOPICAL
Qty: 1 | Refills: 0
Start: 2024-12-27 | End: 2024-12-31

## 2024-12-27 NOTE — PROGRESS NOTE ADULT - SUBJECTIVE AND OBJECTIVE BOX
nasal swab culture is positive   MSA, MRSA detected  Mupirocin ointment ordered and patient informed  patient verbalized understanding

## 2024-12-30 PROBLEM — M16.12 UNILATERAL PRIMARY OSTEOARTHRITIS, LEFT HIP: Chronic | Status: ACTIVE | Noted: 2024-12-26

## 2024-12-30 PROBLEM — E66.9 OBESITY, UNSPECIFIED: Chronic | Status: ACTIVE | Noted: 2024-12-26

## 2024-12-30 PROBLEM — Z87.2 PERSONAL HISTORY OF DISEASES OF THE SKIN AND SUBCUTANEOUS TISSUE: Chronic | Status: ACTIVE | Noted: 2024-12-26

## 2024-12-30 PROBLEM — J30.2 OTHER SEASONAL ALLERGIC RHINITIS: Chronic | Status: ACTIVE | Noted: 2024-12-26

## 2024-12-30 PROBLEM — R03.0 ELEVATED BLOOD-PRESSURE READING, WITHOUT DIAGNOSIS OF HYPERTENSION: Chronic | Status: ACTIVE | Noted: 2024-12-26

## 2024-12-30 PROBLEM — M54.2 CERVICALGIA: Chronic | Status: ACTIVE | Noted: 2024-12-26

## 2024-12-30 PROBLEM — M54.17 RADICULOPATHY, LUMBOSACRAL REGION: Chronic | Status: ACTIVE | Noted: 2024-12-26

## 2024-12-30 PROBLEM — Z91.09 OTHER ALLERGY STATUS, OTHER THAN TO DRUGS AND BIOLOGICAL SUBSTANCES: Chronic | Status: ACTIVE | Noted: 2024-12-26

## 2024-12-30 PROBLEM — M54.9 DORSALGIA, UNSPECIFIED: Chronic | Status: ACTIVE | Noted: 2024-12-26

## 2025-01-01 ENCOUNTER — NON-APPOINTMENT (OUTPATIENT)
Age: 75
End: 2025-01-01

## 2025-01-02 ENCOUNTER — NON-APPOINTMENT (OUTPATIENT)
Age: 75
End: 2025-01-02

## 2025-01-02 ENCOUNTER — APPOINTMENT (OUTPATIENT)
Dept: ALLERGY | Facility: CLINIC | Age: 75
End: 2025-01-02
Payer: MEDICARE

## 2025-01-02 VITALS
OXYGEN SATURATION: 98 % | TEMPERATURE: 98.3 F | HEART RATE: 71 BPM | DIASTOLIC BLOOD PRESSURE: 72 MMHG | SYSTOLIC BLOOD PRESSURE: 138 MMHG | RESPIRATION RATE: 15 BRPM

## 2025-01-02 PROCEDURE — 99203 OFFICE O/P NEW LOW 30 MIN: CPT | Mod: 25

## 2025-01-02 PROCEDURE — 95044 PATCH/APPLICATION TESTS: CPT

## 2025-01-06 ENCOUNTER — APPOINTMENT (OUTPATIENT)
Dept: ALLERGY | Facility: CLINIC | Age: 75
End: 2025-01-06
Payer: MEDICARE

## 2025-01-06 PROCEDURE — 99212 OFFICE O/P EST SF 10 MIN: CPT

## 2025-01-08 ENCOUNTER — APPOINTMENT (OUTPATIENT)
Dept: ALLERGY | Facility: CLINIC | Age: 75
End: 2025-01-08
Payer: MEDICARE

## 2025-01-08 PROCEDURE — 99213 OFFICE O/P EST LOW 20 MIN: CPT

## 2025-01-14 RX ORDER — ACETAMINOPHEN 160 MG/5ML
1000 SUSPENSION ORAL ONCE
Refills: 0 | Status: COMPLETED | OUTPATIENT
Start: 2025-01-22 | End: 2025-01-22

## 2025-01-14 RX ORDER — CEFAZOLIN SODIUM IN 0.9 % NACL 2 G/10 ML
3000 SYRINGE (ML) INTRAVENOUS ONCE
Refills: 0 | Status: COMPLETED | OUTPATIENT
Start: 2025-01-22 | End: 2025-01-22

## 2025-01-14 RX ORDER — TRANEXAMIC ACID 100 MG/ML
1000 INJECTION, SOLUTION INTRAVENOUS ONCE
Refills: 0 | Status: COMPLETED | OUTPATIENT
Start: 2025-01-22 | End: 2025-01-22

## 2025-01-15 ENCOUNTER — APPOINTMENT (OUTPATIENT)
Dept: ORTHOPEDIC SURGERY | Facility: HOSPITAL | Age: 75
End: 2025-01-15

## 2025-01-16 ENCOUNTER — NON-APPOINTMENT (OUTPATIENT)
Age: 75
End: 2025-01-16

## 2025-01-17 ENCOUNTER — NON-APPOINTMENT (OUTPATIENT)
Age: 75
End: 2025-01-17

## 2025-01-17 ENCOUNTER — APPOINTMENT (OUTPATIENT)
Dept: INTERNAL MEDICINE | Facility: CLINIC | Age: 75
End: 2025-01-17
Payer: MEDICARE

## 2025-01-17 ENCOUNTER — APPOINTMENT (OUTPATIENT)
Dept: CARDIOLOGY | Facility: CLINIC | Age: 75
End: 2025-01-17
Payer: MEDICARE

## 2025-01-17 VITALS
WEIGHT: 260 LBS | OXYGEN SATURATION: 99 % | SYSTOLIC BLOOD PRESSURE: 137 MMHG | BODY MASS INDEX: 32.33 KG/M2 | HEIGHT: 75 IN | HEART RATE: 78 BPM | TEMPERATURE: 98 F | DIASTOLIC BLOOD PRESSURE: 89 MMHG

## 2025-01-17 VITALS
BODY MASS INDEX: 32.33 KG/M2 | WEIGHT: 260 LBS | DIASTOLIC BLOOD PRESSURE: 90 MMHG | HEART RATE: 72 BPM | HEIGHT: 75 IN | OXYGEN SATURATION: 97 % | SYSTOLIC BLOOD PRESSURE: 160 MMHG

## 2025-01-17 VITALS — SYSTOLIC BLOOD PRESSURE: 136 MMHG | DIASTOLIC BLOOD PRESSURE: 90 MMHG

## 2025-01-17 DIAGNOSIS — Z01.818 ENCOUNTER FOR OTHER PREPROCEDURAL EXAMINATION: ICD-10-CM

## 2025-01-17 DIAGNOSIS — Z86.79 PERSONAL HISTORY OF OTHER DISEASES OF THE CIRCULATORY SYSTEM: ICD-10-CM

## 2025-01-17 DIAGNOSIS — M16.12 UNILATERAL PRIMARY OSTEOARTHRITIS, LEFT HIP: ICD-10-CM

## 2025-01-17 DIAGNOSIS — Z01.810 ENCOUNTER FOR PREPROCEDURAL CARDIOVASCULAR EXAMINATION: ICD-10-CM

## 2025-01-17 PROCEDURE — G2211 COMPLEX E/M VISIT ADD ON: CPT

## 2025-01-17 PROCEDURE — 93000 ELECTROCARDIOGRAM COMPLETE: CPT

## 2025-01-17 PROCEDURE — 99387 INIT PM E/M NEW PAT 65+ YRS: CPT

## 2025-01-17 PROCEDURE — 99204 OFFICE O/P NEW MOD 45 MIN: CPT

## 2025-01-21 RX ORDER — VANCOMYCIN HYDROCHLORIDE 50 MG/ML
1750 KIT ORAL ONCE
Refills: 0 | Status: DISCONTINUED | OUTPATIENT
Start: 2025-01-22 | End: 2025-01-22

## 2025-01-21 RX ORDER — TRANEXAMIC ACID 100 MG/ML
1000 INJECTION, SOLUTION INTRAVENOUS ONCE
Refills: 0 | Status: COMPLETED | OUTPATIENT
Start: 2025-01-22 | End: 2025-01-22

## 2025-01-21 RX ORDER — ANTISEPTIC SURGICAL SCRUB 0.04 MG/ML
1 SOLUTION TOPICAL ONCE
Refills: 0 | Status: COMPLETED | OUTPATIENT
Start: 2025-01-22 | End: 2025-01-22

## 2025-01-22 ENCOUNTER — INPATIENT (INPATIENT)
Facility: HOSPITAL | Age: 75
LOS: 1 days | Discharge: HOME | DRG: 554 | End: 2025-01-24
Attending: ORTHOPAEDIC SURGERY | Admitting: ORTHOPAEDIC SURGERY
Payer: MEDICARE

## 2025-01-22 ENCOUNTER — TRANSCRIPTION ENCOUNTER (OUTPATIENT)
Age: 75
End: 2025-01-22

## 2025-01-22 ENCOUNTER — APPOINTMENT (OUTPATIENT)
Dept: ORTHOPEDIC SURGERY | Facility: HOSPITAL | Age: 75
End: 2025-01-22

## 2025-01-22 ENCOUNTER — RESULT REVIEW (OUTPATIENT)
Age: 75
End: 2025-01-22

## 2025-01-22 VITALS
HEART RATE: 75 BPM | HEIGHT: 75.5 IN | TEMPERATURE: 98 F | SYSTOLIC BLOOD PRESSURE: 163 MMHG | DIASTOLIC BLOOD PRESSURE: 96 MMHG | OXYGEN SATURATION: 100 % | RESPIRATION RATE: 20 BRPM | WEIGHT: 257.72 LBS

## 2025-01-22 DIAGNOSIS — M16.12 UNILATERAL PRIMARY OSTEOARTHRITIS, LEFT HIP: ICD-10-CM

## 2025-01-22 LAB — BLD GP AB SCN SERPL QL: SIGNIFICANT CHANGE UP

## 2025-01-22 PROCEDURE — 73501 X-RAY EXAM HIP UNI 1 VIEW: CPT | Mod: 26,LT

## 2025-01-22 PROCEDURE — 27130 TOTAL HIP ARTHROPLASTY: CPT | Mod: LT

## 2025-01-22 PROCEDURE — 20985 CPTR-ASST DIR MS PX: CPT

## 2025-01-22 PROCEDURE — 27130 TOTAL HIP ARTHROPLASTY: CPT | Mod: AS,LT

## 2025-01-22 DEVICE — IMPLANTABLE DEVICE: Type: IMPLANTABLE DEVICE | Site: LEFT | Status: FUNCTIONAL

## 2025-01-22 DEVICE — SHELL ACET R3 XLPE STD NO HOLE 60MM: Type: IMPLANTABLE DEVICE | Site: LEFT | Status: FUNCTIONAL

## 2025-01-22 DEVICE — HEAD TAPER FEMORAL 28MM OXINIUM: Type: IMPLANTABLE DEVICE | Site: LEFT | Status: FUNCTIONAL

## 2025-01-22 RX ORDER — SENNOSIDES 8.6 MG
2 TABLET ORAL AT BEDTIME
Refills: 0 | Status: DISCONTINUED | OUTPATIENT
Start: 2025-01-22 | End: 2025-01-24

## 2025-01-22 RX ORDER — POLYETHYLENE GLYCOL 3350 17 G/17G
17 POWDER, FOR SOLUTION ORAL AT BEDTIME
Refills: 0 | Status: DISCONTINUED | OUTPATIENT
Start: 2025-01-22 | End: 2025-01-24

## 2025-01-22 RX ORDER — MAGNESIUM HYDROXIDE 400 MG/5ML
30 SUSPENSION, ORAL (FINAL DOSE FORM) ORAL DAILY
Refills: 0 | Status: DISCONTINUED | OUTPATIENT
Start: 2025-01-22 | End: 2025-01-24

## 2025-01-22 RX ORDER — PANTOPRAZOLE 20 MG/1
40 TABLET, DELAYED RELEASE ORAL
Refills: 0 | Status: DISCONTINUED | OUTPATIENT
Start: 2025-01-22 | End: 2025-01-24

## 2025-01-22 RX ORDER — SODIUM CHLORIDE 9 G/ML
1000 INJECTION, SOLUTION INTRAVENOUS
Refills: 0 | Status: DISCONTINUED | OUTPATIENT
Start: 2025-01-22 | End: 2025-01-22

## 2025-01-22 RX ORDER — DEXAMETHASONE SODIUM PHOSPHATE 4 MG/ML
8 INJECTION, SOLUTION INTRA-ARTICULAR; INTRALESIONAL; INTRAMUSCULAR; INTRAVENOUS; SOFT TISSUE ONCE
Refills: 0 | Status: COMPLETED | OUTPATIENT
Start: 2025-01-23 | End: 2025-01-23

## 2025-01-22 RX ORDER — HYDROMORPHONE HYDROCHLORIDE 4 MG/ML
0.2 INJECTION, SOLUTION INTRAMUSCULAR; INTRAVENOUS; SUBCUTANEOUS
Refills: 0 | Status: DISCONTINUED | OUTPATIENT
Start: 2025-01-22 | End: 2025-01-22

## 2025-01-22 RX ORDER — ONDANSETRON 4 MG/1
4 TABLET, ORALLY DISINTEGRATING ORAL EVERY 6 HOURS
Refills: 0 | Status: DISCONTINUED | OUTPATIENT
Start: 2025-01-22 | End: 2025-01-24

## 2025-01-22 RX ORDER — CEFAZOLIN SODIUM IN 0.9 % NACL 2 G/10 ML
2000 SYRINGE (ML) INTRAVENOUS ONCE
Refills: 0 | Status: COMPLETED | OUTPATIENT
Start: 2025-01-22 | End: 2025-01-22

## 2025-01-22 RX ORDER — MAGNESIUM, ALUMINUM HYDROXIDE 200-225/5
30 SUSPENSION, ORAL (FINAL DOSE FORM) ORAL
Refills: 0 | Status: DISCONTINUED | OUTPATIENT
Start: 2025-01-22 | End: 2025-01-24

## 2025-01-22 RX ORDER — POLYETHYLENE GLYCOL 3350 17 G/17G
17 POWDER, FOR SOLUTION ORAL
Qty: 0 | Refills: 0 | DISCHARGE
Start: 2025-01-22

## 2025-01-22 RX ORDER — HYDROMORPHONE HYDROCHLORIDE 4 MG/ML
0.5 INJECTION, SOLUTION INTRAMUSCULAR; INTRAVENOUS; SUBCUTANEOUS
Refills: 0 | Status: DISCONTINUED | OUTPATIENT
Start: 2025-01-22 | End: 2025-01-24

## 2025-01-22 RX ORDER — SENNOSIDES 8.6 MG
2 TABLET ORAL
Qty: 0 | Refills: 0 | DISCHARGE
Start: 2025-01-22

## 2025-01-22 RX ORDER — ACETAMINOPHEN 160 MG/5ML
2 SUSPENSION ORAL
Qty: 0 | Refills: 0 | DISCHARGE
Start: 2025-01-22

## 2025-01-22 RX ORDER — ONDANSETRON 4 MG/1
4 TABLET, ORALLY DISINTEGRATING ORAL ONCE
Refills: 0 | Status: DISCONTINUED | OUTPATIENT
Start: 2025-01-22 | End: 2025-01-22

## 2025-01-22 RX ORDER — ASPIRIN 81 MG/1
81 TABLET, COATED ORAL EVERY 12 HOURS
Refills: 0 | Status: DISCONTINUED | OUTPATIENT
Start: 2025-01-23 | End: 2025-01-24

## 2025-01-22 RX ORDER — OXYCODONE HYDROCHLORIDE 30 MG/1
10 TABLET ORAL
Refills: 0 | Status: DISCONTINUED | OUTPATIENT
Start: 2025-01-22 | End: 2025-01-24

## 2025-01-22 RX ORDER — ACETAMINOPHEN 160 MG/5ML
1000 SUSPENSION ORAL EVERY 8 HOURS
Refills: 0 | Status: DISCONTINUED | OUTPATIENT
Start: 2025-01-23 | End: 2025-01-24

## 2025-01-22 RX ORDER — CELECOXIB 200 MG
200 CAPSULE ORAL EVERY 12 HOURS
Refills: 0 | Status: DISCONTINUED | OUTPATIENT
Start: 2025-01-22 | End: 2025-01-24

## 2025-01-22 RX ORDER — VANCOMYCIN HYDROCHLORIDE 50 MG/ML
1750 KIT ORAL ONCE
Refills: 0 | Status: COMPLETED | OUTPATIENT
Start: 2025-01-23 | End: 2025-01-23

## 2025-01-22 RX ORDER — SODIUM CHLORIDE 9 G/ML
1000 INJECTION, SOLUTION INTRAVENOUS
Refills: 0 | Status: DISCONTINUED | OUTPATIENT
Start: 2025-01-22 | End: 2025-01-24

## 2025-01-22 RX ORDER — BISACODYL 5 MG
10 TABLET, DELAYED RELEASE (ENTERIC COATED) ORAL ONCE
Refills: 0 | Status: DISCONTINUED | OUTPATIENT
Start: 2025-01-24 | End: 2025-01-24

## 2025-01-22 RX ORDER — OXYCODONE HYDROCHLORIDE 30 MG/1
5 TABLET ORAL
Refills: 0 | Status: DISCONTINUED | OUTPATIENT
Start: 2025-01-22 | End: 2025-01-24

## 2025-01-22 RX ORDER — CEFAZOLIN SODIUM IN 0.9 % NACL 2 G/10 ML
2000 SYRINGE (ML) INTRAVENOUS EVERY 8 HOURS
Refills: 0 | Status: COMPLETED | OUTPATIENT
Start: 2025-01-23 | End: 2025-01-23

## 2025-01-22 RX ORDER — HYDROMORPHONE HYDROCHLORIDE 4 MG/ML
0.5 INJECTION, SOLUTION INTRAMUSCULAR; INTRAVENOUS; SUBCUTANEOUS
Refills: 0 | Status: DISCONTINUED | OUTPATIENT
Start: 2025-01-22 | End: 2025-01-22

## 2025-01-22 RX ORDER — ACETAMINOPHEN 160 MG/5ML
1000 SUSPENSION ORAL ONCE
Refills: 0 | Status: COMPLETED | OUTPATIENT
Start: 2025-01-22 | End: 2025-01-22

## 2025-01-22 RX ADMIN — SODIUM CHLORIDE 100 MILLILITER(S): 9 INJECTION, SOLUTION INTRAVENOUS at 20:23

## 2025-01-22 RX ADMIN — Medication 2 TABLET(S): at 21:51

## 2025-01-22 RX ADMIN — Medication 100 MILLIGRAM(S): at 16:34

## 2025-01-22 RX ADMIN — Medication 200 MILLIGRAM(S): at 20:23

## 2025-01-22 RX ADMIN — ANTISEPTIC SURGICAL SCRUB 1 APPLICATION(S): 0.04 SOLUTION TOPICAL at 10:43

## 2025-01-22 RX ADMIN — ACETAMINOPHEN 1000 MILLIGRAM(S): 160 SUSPENSION ORAL at 21:47

## 2025-01-22 RX ADMIN — OXYCODONE HYDROCHLORIDE 5 MILLIGRAM(S): 30 TABLET ORAL at 21:15

## 2025-01-22 RX ADMIN — ACETAMINOPHEN 400 MILLIGRAM(S): 160 SUSPENSION ORAL at 20:23

## 2025-01-22 RX ADMIN — Medication 200 MILLIGRAM(S): at 21:47

## 2025-01-22 RX ADMIN — OXYCODONE HYDROCHLORIDE 5 MILLIGRAM(S): 30 TABLET ORAL at 20:23

## 2025-01-22 NOTE — DISCHARGE NOTE PROVIDER - NSDCCPCAREPLAN_GEN_ALL_CORE_FT
PRINCIPAL DISCHARGE DIAGNOSIS  Diagnosis: Primary osteoarthritis of left hip  Assessment and Plan of Treatment: TOTAL HIP PRECAUTIONS  *Remember to continue Follow all verbal and written instructions. Take medications as prescribed. DO NOT drive, operate machinery, and/or make important decisions while on prescription pain medication. DO NOT hesitate to call Doctor's office with questions or concerns. of the precautions for total hip replacement. Your surgeon will tell you when and if you can move beyond these limitations.  • Do not bend your hip more than 90 degrees   • Do not cross your legs or ankles when laying sitting or standing.  • Do not raise your operated leg up with your knee straight.  • DO NOT bend over at your waist.  • Avoid sitting in low, soft chairs such as sofas and car seats. You should sit on a chair using firm pillows to raise the height of the seat.  • Make sure your bed level is high, so that you maintain proper leg positioning when sitting on the side, or getting in or out.  • Avoid sitting in low, soft chairs, such as sofas, easy chairs etc.   • When entering and traveling by car:      -Sit in the front passenger seat. Make sure that the car seat is Follow all verbal and written instructions. Take medications as prescribed. DO NOT drive, operate machinery, and/or make important decisions while on prescription pain medication. DO NOT hesitate to call Doctor's office with questions or concerns. the way back and semi-reclined before entering.  • Do not allow your knees to come together when sitting or lying in bed.  • Do not take a tub bath yet.   • Do not resume driving until you have your surgeon’s permission.  Wound Care  • Keep your incision clean and dry.  • You may use an ice pack for 20 minutes on and 20 minutes off to decrease pain and swelling.  • Follow up with your Primary Care Physician within 2 weeks from arrival home for examination and blood work.   You have a JOSE G dressing. You may shower. Disconnect JOSE G battery prior to showering. Reconnect battery after showering and press orange button to resume JOSE G power. Remove JOSE G dressing on post-op day #7.

## 2025-01-22 NOTE — PHYSICAL THERAPY INITIAL EVALUATION ADULT - GENERAL OBSERVATIONS, REHAB EVAL
pt received in supine in PACU +IV +SCDs +ice pack +JOSE G +pillow between LE +tele; with no complaints in NAD

## 2025-01-22 NOTE — DISCHARGE NOTE PROVIDER - NSDCCPTREATMENT_GEN_ALL_CORE_FT
PRINCIPAL PROCEDURE  Procedure: Total replacement of left hip joint by posterior approach  Findings and Treatment: RALEIGH

## 2025-01-22 NOTE — PHYSICAL THERAPY INITIAL EVALUATION ADULT - MANUAL MUSCLE TESTING RESULTS, REHAB EVAL
generalized weakness secondary to surgery L LE grossly 3-/5; B UE R LE grossly >=3+/5/grossly assessed due to

## 2025-01-22 NOTE — DISCHARGE NOTE PROVIDER - NSDCMRMEDTOKEN_GEN_ALL_CORE_FT
Advil 200 mg oral tablet: 1 tab(s) orally prn  mupirocin 2% topical ointment: 1 application in each affected nostril 2 times a day apply each nostril twice daily  for 5 days  Tylenol 500 mg oral tablet: 2 tab(s) orally prn   acetaminophen 500 mg oral tablet: 2 tab(s) orally every 8 hours  aspirin 81 mg oral delayed release tablet: 1 tab(s) orally every 12 hours Take 2 hours before Celebrex  CeleBREX 200 mg oral capsule: 1 cap(s) orally every 12 hours take 2 hours after Aspirin  omeprazole 20 mg oral delayed release capsule: 1 cap(s) orally once a day  oxyCODONE 5 mg oral tablet: 1 tab(s) orally every 4 hours as needed for  moderate pain For severe pain, may take 2 tabs as needed. Do Not exceed 6 tabs per day. MDD: 6  polyethylene glycol 3350 oral powder for reconstitution: 17 gram(s) orally once a day (at bedtime)  senna leaf extract oral tablet: 2 tab(s) orally once a day (at bedtime)   acetaminophen 500 mg oral tablet: 2 tab(s) orally every 8 hours  aspirin 81 mg oral delayed release tablet: 1 tab(s) orally every 12 hours Take 2 hours before Celebrex  Bactrim  mg-160 mg oral tablet: 1 tab(s) orally 2 times a day  CeleBREX 200 mg oral capsule: 1 cap(s) orally every 12 hours take 2 hours after Aspirin  omeprazole 20 mg oral delayed release capsule: 1 cap(s) orally once a day  oxyCODONE 5 mg oral tablet: 1 tab(s) orally every 4 hours as needed for  moderate pain For severe pain, may take 2 tabs as needed. Do Not exceed 6 tabs per day. MDD: 6  polyethylene glycol 3350 oral powder for reconstitution: 17 gram(s) orally once a day (at bedtime)  senna leaf extract oral tablet: 2 tab(s) orally once a day (at bedtime)

## 2025-01-22 NOTE — DISCHARGE NOTE PROVIDER - CARE PROVIDER_API CALL
Flaquito Walker Jovany  Orthopaedic Surgery  [address] [place] [id number]  Phone: [phone]  Fax: [phone]  Follow Up Time: 2 weeks

## 2025-01-22 NOTE — DISCHARGE NOTE PROVIDER - HOSPITAL COURSE
This patient is a 74 y.o. male with severe osteoarthritis of the left hip.  After admission on 1/22/25 and receiving pre-operative parenteral prophylactic antibiotics, the patient underwent an uncomplicated Left Posterior Total Hip Replacement by Dr. Walker.    A medical consultation from the Hospitalist service was obtained for post-operative medical co-management.   Typical Physical & occupational therapy modalities post Left Posterior Total Hip Replacement were performed including ambulation training, range of motion, ADL's, and transfers.  Aspirin 81mg q 12 h was given for DVT prophylaxis.  The patient had a clean appearing dressing with no sign of surgical site infections and had a stable neuro / vascular exam of the operated extremity.     Discharge and Orthopedic Care instructions were delineated in the Discharge Plan and reviewed with the patient.   All medications were delineated in the medication reconciliation tool and key points were reviewed with the patient.   The patient was deemed stable from an Orthopedic & medical standpoint for discharge today.  He will follow up with Dr. Walker for further orthopedic care upon completion of Home care PT.  Patient is going home with home care PT This patient is a 74 y.o. male with severe osteoarthritis of the left hip.  After admission on 1/22/25 and receiving pre-operative parenteral prophylactic antibiotics, the patient underwent an uncomplicated Left Posterior Total Hip Replacement by Dr. Walker.    A medical consultation from the Hospitalist service was obtained for post-operative medical co-management.   Typical Physical & occupational therapy modalities post Left Posterior Total Hip Replacement were performed including ambulation training, range of motion, ADL's, and transfers.  Aspirin 81mg q 12 h was given for DVT prophylaxis.  The patient had a clean appearing dressing with no sign of surgical site infections and had a stable neuro / vascular exam of the operated extremity.     Discharge and Orthopedic Care instructions were delineated in the Discharge Plan and reviewed with the patient.   All medications were delineated in the medication reconciliation tool and key points were reviewed with the patient.   The patient was deemed stable from an Orthopedic & medical standpoint for discharge today.  He will follow up with Dr. Walker for further orthopedic care upon completion of Home care PT.  Patient is going home with home care PT     [phone]

## 2025-01-22 NOTE — CONSULT NOTE ADULT - ASSESSMENT
73 yo M with presenting for elective L THR    Aftercare following surgery  -WBAT  -PT/OT  -Early ambulation  -Pain management  -Incentive Spirometry  -DVT ppx as per ortho           73 yo M with presenting for elective L THR    Aftercare following surgery  -WBAT  -PT/OT  -Early ambulation  -Pain management  -Incentive Spirometry  -DVT ppx as per ortho    PVC  Patient is asymptomatic   Monitor on remote tele overnight

## 2025-01-22 NOTE — CONSULT NOTE ADULT - SUBJECTIVE AND OBJECTIVE BOX
Patient is a 74y old  Male who presents with a chief complaint of Left Posterior Total Hip Replacement      FROM ADMISSION H+P:   HPI: 75 y/o male with h/o osteoarthritis presents with complaining of left hip pain that started a few  years ago. He complains  left-sided low back pain going to his left buttocks and down his left leg to his left ankle Patient states he has trouble walking long distances . Patient denies that he has any numbness or tingling. Patient seen post op. Tolerated procedure well without complications. C/o mild pain at surgical site. Denies any new complaints.      ----  PAST MEDICAL & SURGICAL HISTORY:  Osteoarthritis of left hip      H/O eczema      Allergy to metal      Seasonal allergies      Lumbosacral radiculitis      Back pain      Neck pain      Elevated blood pressure reading      Obese      No significant past surgical history          ----  Home Medications:  Advil 200 mg oral tablet: 1 tab(s) orally prn (22 Jan 2025 10:59)  Tylenol 500 mg oral tablet: 2 tab(s) orally prn (22 Jan 2025 10:59)    ----  FAMILY HISTORY:  FH: diabetic complications (Mother)        ----  Allergies    pollen (Rhinitis)  No Known Drug Allergies  metals, costume jewelry (Hives)    Intolerances        ----  Social History:  Denies current alcohol, tobacco or drug use     ----  REVIEW OF SYSTEMS:  CONSTITUTIONAL: denies fever, chills, fatigue, weakness  HEENT: denies blurred vision, sore throat  CARDIOVASCULAR: denies chest pain, chest pressure, palpitations  RESPIRATORY: denies shortness of breath, sputum production  GASTROINTESTINAL: denies nausea, vomiting, diarrhea, abdominal pain  GENITOURINARY: denies dysuria, discharge  NEUROLOGICAL: denies numbness, headache, focal weakness  HEMATOLOGIC: denies gross bleeding, bruising    ----  PHYSICAL EXAM:  GENERAL: patient appears well, no acute distress  EYES: sclera clear, no exudates  LUNGS:  clear to auscultation, symmetric breath sounds  HEART: soft S1/S2, regular rate and rhythm, no murmurs noted, no noted edema to bilateral lower extremities  GASTROINTESTINAL: abdomen is soft, nontender, nondistended, normoactive bowel sounds  INTEGUMENT: good skin turgor, appropriate for ethnicity, appears well perfused, no jaundice noted  MUSCULOSKELETAL: dressing clean/dry/intact, no clubbing or cyanosis, no obvious deformity  NEUROLOGIC: awake, alert, no obvious sensory deficits    T(C): 36.2 (01-22-25 @ 16:05), Max: 36.5 (01-22-25 @ 10:56)  HR: 63 (01-22-25 @ 16:20) (61 - 75)  BP: 124/70 (01-22-25 @ 16:20) (105/82 - 163/96)  RR: 14 (01-22-25 @ 16:20) (14 - 20)  SpO2: 100% (01-22-25 @ 16:20) (100% - 100%)  Wt(kg): --    ----  INPATIENT MEDICATIONS  ceFAZolin   IVPB 2000 milliGRAM(s) IV Intermittent once  vancomycin  IVPB 1750 milliGRAM(s) IV Intermittent once      ----  I&O's Summary    22 Jan 2025 07:01  -  22 Jan 2025 16:24  --------------------------------------------------------  IN: 1100 mL / OUT: 0 mL / NET: 1100 mL        LABS:              CAPILLARY BLOOD GLUCOSE                    ----  Personally reviewed:  Vital sign trends: [ x ] yes    [  ] no     [  ] n/a  Laboratory results: [x  ] yes    [  ] no     [  ] n/a   Patient is a 74y old  Male who presents with a chief complaint of Left Posterior Total Hip Replacement      FROM ADMISSION H+P:   HPI: 75 y/o male with h/o osteoarthritis presents with complaining of left hip pain that started a few  years ago. He complains  left-sided low back pain going to his left buttocks and down his left leg to his left ankle Patient states he has trouble walking long distances . Patient denies that he has any numbness or tingling. Patient seen post op. Tolerated procedure well without complications. C/o mild pain at surgical site. Denies any new complaints.  Patient noted to have PVC's on monitor, no cardiac hx. Pre-op EKG reviewed nsr, patient is asymptomatic    ----  PAST MEDICAL & SURGICAL HISTORY:  Osteoarthritis of left hip      H/O eczema      Allergy to metal      Seasonal allergies      Lumbosacral radiculitis      Back pain      Neck pain      Elevated blood pressure reading      Obese      No significant past surgical history          ----  Home Medications:  Advil 200 mg oral tablet: 1 tab(s) orally prn (22 Jan 2025 10:59)  Tylenol 500 mg oral tablet: 2 tab(s) orally prn (22 Jan 2025 10:59)    ----  FAMILY HISTORY:  FH: diabetic complications (Mother)        ----  Allergies    pollen (Rhinitis)  No Known Drug Allergies  metals, costume jewelry (Hives)    Intolerances        ----  Social History:  Denies current alcohol, tobacco or drug use     ----  REVIEW OF SYSTEMS:  CONSTITUTIONAL: denies fever, chills, fatigue, weakness  HEENT: denies blurred vision, sore throat  CARDIOVASCULAR: denies chest pain, chest pressure, palpitations  RESPIRATORY: denies shortness of breath, sputum production  GASTROINTESTINAL: denies nausea, vomiting, diarrhea, abdominal pain  GENITOURINARY: denies dysuria, discharge  NEUROLOGICAL: denies numbness, headache, focal weakness  HEMATOLOGIC: denies gross bleeding, bruising    ----  PHYSICAL EXAM:  GENERAL: patient appears well, no acute distress  EYES: sclera clear, no exudates  LUNGS:  clear to auscultation, symmetric breath sounds  HEART: soft S1/S2, regular rate and rhythm, no murmurs noted, no noted edema to bilateral lower extremities  GASTROINTESTINAL: abdomen is soft, nontender, nondistended, normoactive bowel sounds  INTEGUMENT: good skin turgor, appropriate for ethnicity, appears well perfused, no jaundice noted  MUSCULOSKELETAL: dressing clean/dry/intact, no clubbing or cyanosis, no obvious deformity  NEUROLOGIC: awake, alert, no obvious sensory deficits    T(C): 36.2 (01-22-25 @ 16:05), Max: 36.5 (01-22-25 @ 10:56)  HR: 63 (01-22-25 @ 16:20) (61 - 75)  BP: 124/70 (01-22-25 @ 16:20) (105/82 - 163/96)  RR: 14 (01-22-25 @ 16:20) (14 - 20)  SpO2: 100% (01-22-25 @ 16:20) (100% - 100%)  Wt(kg): --    ----  INPATIENT MEDICATIONS  ceFAZolin   IVPB 2000 milliGRAM(s) IV Intermittent once  vancomycin  IVPB 1750 milliGRAM(s) IV Intermittent once      ----  I&O's Summary    22 Jan 2025 07:01  -  22 Jan 2025 16:24  --------------------------------------------------------  IN: 1100 mL / OUT: 0 mL / NET: 1100 mL        LABS:              CAPILLARY BLOOD GLUCOSE                    ----  Personally reviewed:  Vital sign trends: [ x ] yes    [  ] no     [  ] n/a  Laboratory results: [x  ] yes    [  ] no     [  ] n/a

## 2025-01-22 NOTE — BRIEF OPERATIVE NOTE - NSICDXBRIEFPROCEDURE_GEN_ALL_CORE_FT
PROCEDURES:  Total replacement of left hip joint by posterior approach 22-Jan-2025 16:02:07  Rob Hargrove   Self

## 2025-01-22 NOTE — PHYSICAL THERAPY INITIAL EVALUATION ADULT - ADDITIONAL COMMENTS
Pt resides in apartment alone, requesting ROMY upon d/c. Pt denies ROBIN/stairs, has elevator. Pt has RW and commode, w/c. Pt has a walk-in shower. Pt reports was independent prior to admission.

## 2025-01-22 NOTE — OCCUPATIONAL THERAPY INITIAL EVALUATION ADULT - ADDITIONAL COMMENTS
Pt lives alone in an apartment, no steps, +elevator, stall shower. Prior to admission, pt was independent with ADLs, IADLs and functional mobility without DME. Pt owns RW, commode, w/c.

## 2025-01-23 ENCOUNTER — TRANSCRIPTION ENCOUNTER (OUTPATIENT)
Age: 75
End: 2025-01-23

## 2025-01-23 LAB
ANION GAP SERPL CALC-SCNC: 8 MMOL/L — SIGNIFICANT CHANGE UP (ref 5–17)
BUN SERPL-MCNC: 13 MG/DL — SIGNIFICANT CHANGE UP (ref 7–23)
CALCIUM SERPL-MCNC: 8.5 MG/DL — SIGNIFICANT CHANGE UP (ref 8.4–10.5)
CHLORIDE SERPL-SCNC: 102 MMOL/L — SIGNIFICANT CHANGE UP (ref 96–108)
CO2 SERPL-SCNC: 25 MMOL/L — SIGNIFICANT CHANGE UP (ref 22–31)
CREAT SERPL-MCNC: 1.24 MG/DL — SIGNIFICANT CHANGE UP (ref 0.5–1.3)
EGFR: 62 ML/MIN/1.73M2 — SIGNIFICANT CHANGE UP
GLUCOSE SERPL-MCNC: 146 MG/DL — HIGH (ref 70–99)
HCT VFR BLD CALC: 33.2 % — LOW (ref 39–50)
HGB BLD-MCNC: 10.8 G/DL — LOW (ref 13–17)
MCHC RBC-ENTMCNC: 29 PG — SIGNIFICANT CHANGE UP (ref 27–34)
MCHC RBC-ENTMCNC: 32.5 G/DL — SIGNIFICANT CHANGE UP (ref 32–36)
MCV RBC AUTO: 89.2 FL — SIGNIFICANT CHANGE UP (ref 80–100)
NRBC # BLD: 0 /100 WBCS — SIGNIFICANT CHANGE UP (ref 0–0)
NRBC BLD-RTO: 0 /100 WBCS — SIGNIFICANT CHANGE UP (ref 0–0)
PLATELET # BLD AUTO: 159 K/UL — SIGNIFICANT CHANGE UP (ref 150–400)
POTASSIUM SERPL-MCNC: 4.3 MMOL/L — SIGNIFICANT CHANGE UP (ref 3.5–5.3)
POTASSIUM SERPL-SCNC: 4.3 MMOL/L — SIGNIFICANT CHANGE UP (ref 3.5–5.3)
RBC # BLD: 3.72 M/UL — LOW (ref 4.2–5.8)
RBC # FLD: 13.2 % — SIGNIFICANT CHANGE UP (ref 10.3–14.5)
SODIUM SERPL-SCNC: 135 MMOL/L — SIGNIFICANT CHANGE UP (ref 135–145)
WBC # BLD: 8.51 K/UL — SIGNIFICANT CHANGE UP (ref 3.8–10.5)
WBC # FLD AUTO: 8.51 K/UL — SIGNIFICANT CHANGE UP (ref 3.8–10.5)

## 2025-01-23 RX ORDER — CELECOXIB 200 MG
1 CAPSULE ORAL
Qty: 60 | Refills: 0
Start: 2025-01-23

## 2025-01-23 RX ORDER — SULFAMETHOXAZOLE AND TRIMETHOPRIM 400; 80 MG/1; MG/1
1 TABLET ORAL
Qty: 14 | Refills: 0
Start: 2025-01-23 | End: 2025-01-29

## 2025-01-23 RX ORDER — OMEPRAZOLE 20 MG/1
1 CAPSULE, DELAYED RELEASE ORAL
Qty: 30 | Refills: 1
Start: 2025-01-23

## 2025-01-23 RX ORDER — OXYCODONE HYDROCHLORIDE 30 MG/1
1 TABLET ORAL
Qty: 42 | Refills: 0
Start: 2025-01-23 | End: 2025-01-29

## 2025-01-23 RX ORDER — ASPIRIN 81 MG/1
1 TABLET, COATED ORAL
Qty: 56 | Refills: 0
Start: 2025-01-23 | End: 2025-02-19

## 2025-01-23 RX ADMIN — ASPIRIN 81 MILLIGRAM(S): 81 TABLET, COATED ORAL at 17:28

## 2025-01-23 RX ADMIN — Medication 100 MILLIGRAM(S): at 08:58

## 2025-01-23 RX ADMIN — Medication 200 MILLIGRAM(S): at 08:58

## 2025-01-23 RX ADMIN — ACETAMINOPHEN 1000 MILLIGRAM(S): 160 SUSPENSION ORAL at 21:55

## 2025-01-23 RX ADMIN — ACETAMINOPHEN 1000 MILLIGRAM(S): 160 SUSPENSION ORAL at 05:39

## 2025-01-23 RX ADMIN — Medication 200 MILLIGRAM(S): at 21:55

## 2025-01-23 RX ADMIN — OXYCODONE HYDROCHLORIDE 5 MILLIGRAM(S): 30 TABLET ORAL at 02:00

## 2025-01-23 RX ADMIN — Medication 100 MILLIGRAM(S): at 00:05

## 2025-01-23 RX ADMIN — DEXAMETHASONE SODIUM PHOSPHATE 101.6 MILLIGRAM(S): 4 INJECTION, SOLUTION INTRA-ARTICULAR; INTRALESIONAL; INTRAMUSCULAR; INTRAVENOUS; SOFT TISSUE at 05:39

## 2025-01-23 RX ADMIN — ASPIRIN 81 MILLIGRAM(S): 81 TABLET, COATED ORAL at 05:39

## 2025-01-23 RX ADMIN — ACETAMINOPHEN 1000 MILLIGRAM(S): 160 SUSPENSION ORAL at 14:15

## 2025-01-23 RX ADMIN — VANCOMYCIN HYDROCHLORIDE 250 MILLIGRAM(S): KIT at 01:07

## 2025-01-23 RX ADMIN — ACETAMINOPHEN 1000 MILLIGRAM(S): 160 SUSPENSION ORAL at 05:40

## 2025-01-23 RX ADMIN — Medication 2 TABLET(S): at 21:56

## 2025-01-23 RX ADMIN — OXYCODONE HYDROCHLORIDE 5 MILLIGRAM(S): 30 TABLET ORAL at 01:15

## 2025-01-23 RX ADMIN — PANTOPRAZOLE 40 MILLIGRAM(S): 20 TABLET, DELAYED RELEASE ORAL at 05:38

## 2025-01-23 NOTE — CARE COORDINATION ASSESSMENT. - NSCAREPROVIDERS_GEN_ALL_CORE_FT
CARE PROVIDERS:  Administration: Sridevi Silva  Administration: Josie Cleveland  Administration: Kulwant Han  Administration: Norbert Chandler  Admitting: Flaquito Walker  Attending: Flaquito Walker  Case Management: Santaromana, Anna  Consultant: Norbert Chandler  Covering Team: Chuck Rock  Nurse: Ros Mullen  Nurse: Gabriela Snyder  Occupational Therapy: Alejandro Truong  Occupational Therapy: Mary Saini  Ordered: Miranda Lemon  PCA/Nursing Assistant: Usman Quintana  Physical Therapy: Luann Knowles  Primary Team: Josselyn Escoto  Primary Team: Lamberto Patel  Primary Team: Rob Hargrove  Primary Team: Samantha Benitez  Primary Team: Miranda Lemon  Primary Team: Azul Gutierrez  Primary Team: Mary Mcfadden  : Melina Duarte  Student: Doug Gurrola  Team: [facility] Hospitalists, Team  UR// Supp. Assoc.: Radha Garay

## 2025-01-23 NOTE — SOCIAL WORK PROGRESS NOTE - NSSWPROGRESSNOTE_GEN_ALL_CORE
Pt has been cleared to go home with home care by PRASHANT. Viki met with the pt who reported that his niece will be staying with him when he is dc home. Pt declined subacute rehab as a dc plan. Cm following the pt for home care.

## 2025-01-23 NOTE — PROGRESS NOTE ADULT - SUBJECTIVE AND OBJECTIVE BOX
Patient is a 74y old  Male who presents with a chief complaint of CARLOTA (23 Jan 2025 10:33)      INTERVAL HPI/OVERNIGHT EVENTS:  feeling well, pain controlled. no new issues.       MEDICATIONS  (STANDING):  acetaminophen     Tablet .. 1000 milliGRAM(s) Oral every 8 hours  aspirin enteric coated 81 milliGRAM(s) Oral every 12 hours  celecoxib 200 milliGRAM(s) Oral every 12 hours  lactated ringers. 1000 milliLiter(s) (100 mL/Hr) IV Continuous <Continuous>  pantoprazole    Tablet 40 milliGRAM(s) Oral before breakfast  polyethylene glycol 3350 17 Gram(s) Oral at bedtime  senna 2 Tablet(s) Oral at bedtime    MEDICATIONS  (PRN):  aluminum hydroxide/magnesium hydroxide/simethicone Suspension 30 milliLiter(s) Oral four times a day PRN Indigestion  HYDROmorphone  Injectable 0.5 milliGRAM(s) IV Push every 3 hours PRN Breakthrough pain  magnesium hydroxide Suspension 30 milliLiter(s) Oral daily PRN Constipation  ondansetron Injectable 4 milliGRAM(s) IV Push every 6 hours PRN Nausea and/or Vomiting  oxyCODONE    IR 5 milliGRAM(s) Oral every 3 hours PRN Moderate Pain (4 - 6)  oxyCODONE    IR 10 milliGRAM(s) Oral every 3 hours PRN Severe Pain (7 - 10)      Allergies  pollen (Rhinitis)  No Known Drug Allergies  metals, costume jewelry (Hives)      REVIEW OF SYSTEMS:  CONSTITUTIONAL: No fever, weight loss, or fatigue  EYES: No eye pain, visual disturbances, or discharge  ENMT:  No difficulty hearing, tinnitus, vertigo; No sinus or throat pain  NECK: No pain or stiffness  BREASTS: No pain, masses, or nipple discharge  RESPIRATORY: No cough, wheezing, chills or hemoptysis; No shortness of breath  CARDIOVASCULAR: No chest pain, palpitations, or lightheadedness  GASTROINTESTINAL: No abdominal or epigastric pain. No nausea, vomiting, or hematemesis; No diarrhea or constipation. No melena or hematochezia.  GENITOURINARY: No dysuria, frequency, hematuria, or incontinence  NEUROLOGICAL: No headaches, vertigo, memory loss, loss of strength, numbness, or tremors  SKIN: No itching, burning, rashes, or lesions   LYMPH NODES: No enlarged glands  ENDOCRINE: No heat or cold intolerance; No hair loss; No polydipsia or polyuria  MUSCULOSKELETAL: No back pain  PSYCHIATRIC: No depression, anxiety, or mood swings  HEME/LYMPH: No easy bruising, or bleeding gums  ALLERGY AND IMMUNOLOGIC: No hives or eczema    Vital Signs Last 24 Hrs  T(C): 36.7 (23 Jan 2025 07:52), Max: 37.1 (22 Jan 2025 18:45)  T(F): 98.1 (23 Jan 2025 07:52), Max: 98.7 (22 Jan 2025 18:45)  HR: 67 (23 Jan 2025 07:52) (60 - 67)  BP: 153/87 (23 Jan 2025 07:52) (105/82 - 159/92)  BP(mean): --  RR: 16 (23 Jan 2025 07:52) (14 - 18)  SpO2: 99% (23 Jan 2025 07:52) (97% - 100%)    Parameters below as of 23 Jan 2025 07:52  Patient On (Oxygen Delivery Method): room air        PHYSICAL EXAM:  GENERAL: NAD, well-groomed, well-developed  HEAD:  Atraumatic, Normocephalic  EYES:  conjunctiva and sclera clear  ENMT: Moist mucous membranes  NECK: Supple, No JVD   NERVOUS SYSTEM:  Alert & Oriented X3, Good concentration; Bilateral LE mobile, sensation to light touch intact  CHEST/LUNG: Clear to auscultation bilaterally;    HEART: Regular rate and rhythm;   ABDOMEN: Soft, Nontender, Nondistended; Bowel sounds present  EXTREMITIES:  2+ Peripheral Pulses, No clubbing or cyanosis  LYMPH: No lymphadenopathy noted  SKIN: No rashes or lesions  INCISION:  Dressing dry and intact    LABS:                        10.8   8.51  )-----------( 159      ( 23 Jan 2025 06:00 )             33.2     23 Jan 2025 06:00    135    |  102    |  13     ----------------------------<  146    4.3     |  25     |  1.24     Ca    8.5        23 Jan 2025 06:00        Urinalysis Basic - ( 23 Jan 2025 06:00 )    Color: x / Appearance: x / SG: x / pH: x  Gluc: 146 mg/dL / Ketone: x  / Bili: x / Urobili: x   Blood: x / Protein: x / Nitrite: x   Leuk Esterase: x / RBC: x / WBC x   Sq Epi: x / Non Sq Epi: x / Bacteria: x      CAPILLARY BLOOD GLUCOSE          RADIOLOGY & ADDITIONAL TESTS:    Imaging Personally Reviewed:      [ ] Consultant(s) Notes Reviewed  [x] Care Discussed with Consultants/Other Providers:  Ortho PA- plan of care

## 2025-01-23 NOTE — DISCHARGE NOTE NURSING/CASE MANAGEMENT/SOCIAL WORK - NSSCTYPOFSERV_GEN_ALL_CORE
[facility]/ Home Care Services with [facility] ( / [phone] ) Home Care PT OR RN will call within 24/48 hrs of DC from the hospital to set up Initial Assessment.   [facility]/ Home Care Services with [facility] [phone]  Home Care RN will call within 24/48 hrs of DC from the hospital to set up Initial Assessment.

## 2025-01-23 NOTE — DISCHARGE NOTE NURSING/CASE MANAGEMENT/SOCIAL WORK - FINANCIAL ASSISTANCE
[facility] provides services at a reduced cost to those who are determined to be eligible through [facility]’s financial assistance program. Information regarding [facility]’s financial assistance program can be found by going to [email or web address] or by calling [phone].
14-Jan-2024

## 2025-01-23 NOTE — DISCHARGE NOTE NURSING/CASE MANAGEMENT/SOCIAL WORK - PATIENT PORTAL LINK FT
You can access the FollowMyHealth Patient Portal offered by [facility] by registering at the following website: [email or web address]. By joining [facility]’s FollowMyHealth portal, you will also be able to view your health information using other applications (apps) compatible with our system.

## 2025-01-23 NOTE — CARE COORDINATION ASSESSMENT. - OTHER PERTINENT DISCHARGE PLANNING INFORMATION:
Pt is unsure about whether he wants to go to subacute rehab or go home with home care. Pt lives alone, but has a nephew who can assist him at home. Pt owns canes, a rollator, commode and a grabber. His PCP is Dr Chase Nathan in [place] and his pharmacy is [facility] in [place]. As a dual plan, pt requested [facility], [place] [facility], [facility], [facility] and [facility] rehab. Referral made to the above facilities.

## 2025-01-23 NOTE — PHARMACOTHERAPY INTERVENTION NOTE - COMMENTS
Transition of Care video discharge education - medication calendar given to patient  Yanique Riggins, PharmD Candidate '25 for [employer], VigneshD. Clinical Pharmacy Specialist, Department of Orthopedics Surgery at [facility].

## 2025-01-23 NOTE — DISCHARGE NOTE NURSING/CASE MANAGEMENT/SOCIAL WORK - NSDCPEFALRISK_GEN_ALL_CORE
For information on Fall & Injury Prevention, visit: [email or web address] OR  [email or web address] OR  https://www.cdc.gov/steadi/patient.html

## 2025-01-23 NOTE — PROGRESS NOTE ADULT - SUBJECTIVE AND OBJECTIVE BOX
Discharge Medication Counseling:  EHT22dl BID x 4 weeks  Omeprazole 20 mg QD x 3-4 weeks  Celebrex 200mg BID x 21 days  APAP 1000mg TID x 2-3 weeks  Narcotic PRN  Docusate 100mg TID while taking narcotic  Miralax, Senna, or Bisacodyl PRN for treatment of constipation   Bactrim DS BID x7days

## 2025-01-23 NOTE — CARE COORDINATION ASSESSMENT. - NSPASTMEDSURGHISTORY_GEN_ALL_CORE_FT
PAST MEDICAL & SURGICAL HISTORY:  Obese      Elevated blood pressure reading      Neck pain      Back pain      Lumbosacral radiculitis      Seasonal allergies      Allergy to metal      H/O eczema      Osteoarthritis of left hip      No significant past surgical history

## 2025-01-23 NOTE — PROGRESS NOTE ADULT - SUBJECTIVE AND OBJECTIVE BOX
POST OPERATIVE DAY #:  [1 ]   STATUS POST: [ x] Left [ ] Right  [ ]TKR [x ]THR                        Patient is a 74y old  Male who presents with a chief complaint of Left Posterior Total Hip Replacement  (22 Jan 2025 16:03)      Pain well controlled    OBJECTIVE:     Vital Signs Last 24 Hrs  T(C): 36.7 (23 Jan 2025 07:52), Max: 37.1 (22 Jan 2025 18:45)  T(F): 98.1 (23 Jan 2025 07:52), Max: 98.7 (22 Jan 2025 18:45)  HR: 67 (23 Jan 2025 07:52) (60 - 75)  BP: 153/87 (23 Jan 2025 07:52) (105/82 - 163/96)  BP(mean): --  RR: 16 (23 Jan 2025 07:52) (14 - 20)  SpO2: 99% (23 Jan 2025 07:52) (97% - 100%)    Parameters below as of 23 Jan 2025 07:52  Patient On (Oxygen Delivery Method): room air        Affected extremity:          Dressing:  clean/dry/intact           Sensation; intact to light touch           Motor exam: Toes warm and mobile         No calf tenderness bilateral LE's    LABS:                        10.8   8.51  )-----------( 159      ( 23 Jan 2025 06:00 )             33.2     01-23    135  |  102  |  13  ----------------------------<  146[H]  4.3   |  25  |  1.24    Ca    8.5      23 Jan 2025 06:00              A/P :    -    Analgesics PRN  -    DVT ppx: [ x]ASA 81 bid [ ] Lovenox [ ] Coumadin   [ ] Eliquis   -    Check AM labs  -    Weight bearing status: WBAT [x ]        PWB    [ ]     TTWB  [ ]      NWB  [ ]  -    Physical Therapy  -    Occupational Therapy  -    Dispo: Home [ ]     Rehab [ ]      ROMY [ ]      To be determined [x ]

## 2025-01-23 NOTE — PROGRESS NOTE ADULT - ASSESSMENT
74M with presenting for elective Left THR due to OA of left hip    Aftercare following surgery  -WBAT  -PT/OT  -Early ambulation  -Pain management  -Incentive Spirometry  -DVT ppx: low risk, aspirin BID    PVC  Patient is asymptomatic   Monitor on remote tele overnight              74M with presenting for elective Left THR due to OA of left hip    Aftercare following surgery  -WBAT  -PT/OT  -Early ambulation  -Pain management  -Incentive Spirometry  -DVT ppx: low risk, aspirin BID    PVC  Patient is asymptomatic   Monitor on remote tele overnight     extended discussion with patient about dc planning concerns.  total time spent 40 minutes.

## 2025-01-24 VITALS
DIASTOLIC BLOOD PRESSURE: 70 MMHG | HEART RATE: 80 BPM | SYSTOLIC BLOOD PRESSURE: 136 MMHG | RESPIRATION RATE: 16 BRPM | TEMPERATURE: 98 F | OXYGEN SATURATION: 99 %

## 2025-01-24 LAB
ANION GAP SERPL CALC-SCNC: 8 MMOL/L — SIGNIFICANT CHANGE UP (ref 5–17)
BUN SERPL-MCNC: 18 MG/DL — SIGNIFICANT CHANGE UP (ref 7–23)
CALCIUM SERPL-MCNC: 8.7 MG/DL — SIGNIFICANT CHANGE UP (ref 8.4–10.5)
CHLORIDE SERPL-SCNC: 106 MMOL/L — SIGNIFICANT CHANGE UP (ref 96–108)
CO2 SERPL-SCNC: 24 MMOL/L — SIGNIFICANT CHANGE UP (ref 22–31)
CREAT SERPL-MCNC: 1.08 MG/DL — SIGNIFICANT CHANGE UP (ref 0.5–1.3)
EGFR: 73 ML/MIN/1.73M2 — SIGNIFICANT CHANGE UP
GLUCOSE SERPL-MCNC: 100 MG/DL — HIGH (ref 70–99)
HCT VFR BLD CALC: 30.8 % — LOW (ref 39–50)
HGB BLD-MCNC: 10 G/DL — LOW (ref 13–17)
MCHC RBC-ENTMCNC: 29 PG — SIGNIFICANT CHANGE UP (ref 27–34)
MCHC RBC-ENTMCNC: 32.5 G/DL — SIGNIFICANT CHANGE UP (ref 32–36)
MCV RBC AUTO: 89.3 FL — SIGNIFICANT CHANGE UP (ref 80–100)
NRBC # BLD: 0 /100 WBCS — SIGNIFICANT CHANGE UP (ref 0–0)
NRBC BLD-RTO: 0 /100 WBCS — SIGNIFICANT CHANGE UP (ref 0–0)
PLATELET # BLD AUTO: 132 K/UL — LOW (ref 150–400)
POTASSIUM SERPL-MCNC: 4.4 MMOL/L — SIGNIFICANT CHANGE UP (ref 3.5–5.3)
POTASSIUM SERPL-SCNC: 4.4 MMOL/L — SIGNIFICANT CHANGE UP (ref 3.5–5.3)
RBC # BLD: 3.45 M/UL — LOW (ref 4.2–5.8)
RBC # FLD: 13.2 % — SIGNIFICANT CHANGE UP (ref 10.3–14.5)
SODIUM SERPL-SCNC: 138 MMOL/L — SIGNIFICANT CHANGE UP (ref 135–145)
WBC # BLD: 9.15 K/UL — SIGNIFICANT CHANGE UP (ref 3.8–10.5)
WBC # FLD AUTO: 9.15 K/UL — SIGNIFICANT CHANGE UP (ref 3.8–10.5)

## 2025-01-24 PROCEDURE — 85027 COMPLETE CBC AUTOMATED: CPT

## 2025-01-24 PROCEDURE — 86901 BLOOD TYPING SEROLOGIC RH(D): CPT

## 2025-01-24 PROCEDURE — 99231 SBSQ HOSP IP/OBS SF/LOW 25: CPT

## 2025-01-24 PROCEDURE — 97535 SELF CARE MNGMENT TRAINING: CPT

## 2025-01-24 PROCEDURE — 97161 PT EVAL LOW COMPLEX 20 MIN: CPT

## 2025-01-24 PROCEDURE — 80048 BASIC METABOLIC PNL TOTAL CA: CPT

## 2025-01-24 PROCEDURE — 97110 THERAPEUTIC EXERCISES: CPT

## 2025-01-24 PROCEDURE — 86900 BLOOD TYPING SEROLOGIC ABO: CPT

## 2025-01-24 PROCEDURE — 86850 RBC ANTIBODY SCREEN: CPT

## 2025-01-24 PROCEDURE — 36415 COLL VENOUS BLD VENIPUNCTURE: CPT

## 2025-01-24 PROCEDURE — C1776: CPT

## 2025-01-24 PROCEDURE — 97165 OT EVAL LOW COMPLEX 30 MIN: CPT

## 2025-01-24 PROCEDURE — 97116 GAIT TRAINING THERAPY: CPT

## 2025-01-24 PROCEDURE — 97530 THERAPEUTIC ACTIVITIES: CPT

## 2025-01-24 PROCEDURE — 73501 X-RAY EXAM HIP UNI 1 VIEW: CPT

## 2025-01-24 RX ADMIN — ACETAMINOPHEN 1000 MILLIGRAM(S): 160 SUSPENSION ORAL at 06:41

## 2025-01-24 RX ADMIN — ACETAMINOPHEN 1000 MILLIGRAM(S): 160 SUSPENSION ORAL at 06:38

## 2025-01-24 RX ADMIN — ACETAMINOPHEN 1000 MILLIGRAM(S): 160 SUSPENSION ORAL at 13:43

## 2025-01-24 RX ADMIN — ACETAMINOPHEN 1000 MILLIGRAM(S): 160 SUSPENSION ORAL at 16:14

## 2025-01-24 RX ADMIN — OXYCODONE HYDROCHLORIDE 5 MILLIGRAM(S): 30 TABLET ORAL at 09:30

## 2025-01-24 RX ADMIN — Medication 200 MILLIGRAM(S): at 09:30

## 2025-01-24 RX ADMIN — Medication 200 MILLIGRAM(S): at 08:43

## 2025-01-24 RX ADMIN — PANTOPRAZOLE 40 MILLIGRAM(S): 20 TABLET, DELAYED RELEASE ORAL at 06:38

## 2025-01-24 RX ADMIN — ASPIRIN 81 MILLIGRAM(S): 81 TABLET, COATED ORAL at 06:38

## 2025-01-24 RX ADMIN — OXYCODONE HYDROCHLORIDE 5 MILLIGRAM(S): 30 TABLET ORAL at 08:43

## 2025-01-24 NOTE — PROGRESS NOTE ADULT - SUBJECTIVE AND OBJECTIVE BOX
Orthopedics  POST OPERATIVE DAY #:  2  STATUS POST: Right Total Hip Arthroplasty        SUBJECTIVE: Patient seen and examined at bedside. Denies nausea, vomiting, diarrhea, chest pain, shortness of breath, numbness, tingling.   Reported Pain Score = 2/10    OBJECTIVE:     Vital Signs Last 24 Hrs  T(C): 36.9 (24 Jan 2025 08:11), Max: 37 (23 Jan 2025 15:35)  T(F): 98.5 (24 Jan 2025 08:11), Max: 98.6 (23 Jan 2025 15:35)  HR: 68 (24 Jan 2025 08:11) (53 - 77)  BP: 119/65 (24 Jan 2025 08:11) (119/65 - 144/61)  BP(mean): --  RR: 16 (24 Jan 2025 08:11) (15 - 16)  SpO2: 98% (24 Jan 2025 08:11) (98% - 100%)    Parameters below as of 24 Jan 2025 08:11  Patient On (Oxygen Delivery Method): room air        Right Hip:          Dressing: clean/dry/intact    Bilateral LEs:         Sensation:  intact to light touch          Motor exam:  5/5 dorsiflexion/plantarflexion/EHL          2+ DP and PT pulses          calf supple, NT         Abduction pillow in place         SCDs in place    LABS:                        10.0   9.15  )-----------( 132      ( 24 Jan 2025 06:30 )             30.8     01-24    138  |  106  |  18  ----------------------------<  100[H]  4.4   |  24  |  1.08    Ca    8.7      24 Jan 2025 06:30        Urinalysis Basic - ( 24 Jan 2025 06:30 )    Color: x / Appearance: x / SG: x / pH: x  Gluc: 100 mg/dL / Ketone: x  / Bili: x / Urobili: x   Blood: x / Protein: x / Nitrite: x   Leuk Esterase: x / RBC: x / WBC x   Sq Epi: x / Non Sq Epi: x / Bacteria: x        MEDICATIONS:  Anticoagulation:  aspirin enteric coated 81 milliGRAM(s) Oral every 12 hours      Pain medications:   acetaminophen     Tablet .. 1000 milliGRAM(s) Oral every 8 hours  celecoxib 200 milliGRAM(s) Oral every 12 hours  HYDROmorphone  Injectable 0.5 milliGRAM(s) IV Push every 3 hours PRN  ondansetron Injectable 4 milliGRAM(s) IV Push every 6 hours PRN  oxyCODONE    IR 5 milliGRAM(s) Oral every 3 hours PRN  oxyCODONE    IR 10 milliGRAM(s) Oral every 3 hours PRN      A/P: s/p Right Total Hip Arthroplasty POD # 2  -    Pain control  -    DVT ppx: Aspirin 81mg BID  -    Weight bearing status: WBAT RLE  -    Continue total hip precautions   -    Physical Therapy  -    Occupational Therapy  -    Discharge plan: home today pending PT/OT/medical clearance Orthopedics  POST OPERATIVE DAY #:  2  STATUS POST: Left Total Hip Arthroplasty        SUBJECTIVE: Patient seen and examined at bedside. Denies nausea, vomiting, diarrhea, chest pain, shortness of breath, numbness, tingling.   Reported Pain Score = 2/10    OBJECTIVE:     Vital Signs Last 24 Hrs  T(C): 36.9 (24 Jan 2025 08:11), Max: 37 (23 Jan 2025 15:35)  T(F): 98.5 (24 Jan 2025 08:11), Max: 98.6 (23 Jan 2025 15:35)  HR: 68 (24 Jan 2025 08:11) (53 - 77)  BP: 119/65 (24 Jan 2025 08:11) (119/65 - 144/61)  BP(mean): --  RR: 16 (24 Jan 2025 08:11) (15 - 16)  SpO2: 98% (24 Jan 2025 08:11) (98% - 100%)    Parameters below as of 24 Jan 2025 08:11  Patient On (Oxygen Delivery Method): room air        Left Hip:          Dressing: clean/dry/intact    Bilateral LEs:         Sensation:  intact to light touch          Motor exam:  5/5 dorsiflexion/plantarflexion/EHL          2+ DP and PT pulses          calf supple, NT         Abduction pillow in place         SCDs in place    LABS:                        10.0   9.15  )-----------( 132      ( 24 Jan 2025 06:30 )             30.8     01-24    138  |  106  |  18  ----------------------------<  100[H]  4.4   |  24  |  1.08    Ca    8.7      24 Jan 2025 06:30        Urinalysis Basic - ( 24 Jan 2025 06:30 )    Color: x / Appearance: x / SG: x / pH: x  Gluc: 100 mg/dL / Ketone: x  / Bili: x / Urobili: x   Blood: x / Protein: x / Nitrite: x   Leuk Esterase: x / RBC: x / WBC x   Sq Epi: x / Non Sq Epi: x / Bacteria: x        MEDICATIONS:  Anticoagulation:  aspirin enteric coated 81 milliGRAM(s) Oral every 12 hours      Pain medications:   acetaminophen     Tablet .. 1000 milliGRAM(s) Oral every 8 hours  celecoxib 200 milliGRAM(s) Oral every 12 hours  HYDROmorphone  Injectable 0.5 milliGRAM(s) IV Push every 3 hours PRN  ondansetron Injectable 4 milliGRAM(s) IV Push every 6 hours PRN  oxyCODONE    IR 5 milliGRAM(s) Oral every 3 hours PRN  oxyCODONE    IR 10 milliGRAM(s) Oral every 3 hours PRN      A/P: s/p LeftTotal Hip Arthroplasty POD # 2  -    Pain control  -    DVT ppx: Aspirin 81mg BID  -    Weight bearing status: WBAT LLE  -    Continue total hip precautions   -    Physical Therapy  -    Occupational Therapy  -    Discharge plan: home today pending PT/OT/medical clearance

## 2025-01-24 NOTE — PROGRESS NOTE ADULT - ASSESSMENT
74M with presenting for elective Left THR due to OA of left hip    Aftercare following surgery  -WBAT  -PT/OT  -Early ambulation  -Pain management  -Incentive Spirometry  -DVT ppx: low risk, aspirin BID    PVC  Patient is asymptomatic   Monitor on remote tele overnight     DC home with home care today.

## 2025-01-24 NOTE — PROGRESS NOTE ADULT - SUBJECTIVE AND OBJECTIVE BOX
Patient is a 74y old  Male who presents with a chief complaint of CARLOTA (23 Jan 2025 10:33)      INTERVAL HPI/OVERNIGHT EVENTS:  feeling well, no complaints.  walking in hallway with walker.       MEDICATIONS  (STANDING):  acetaminophen     Tablet .. 1000 milliGRAM(s) Oral every 8 hours  aspirin enteric coated 81 milliGRAM(s) Oral every 12 hours  celecoxib 200 milliGRAM(s) Oral every 12 hours  lactated ringers. 1000 milliLiter(s) (100 mL/Hr) IV Continuous <Continuous>  pantoprazole    Tablet 40 milliGRAM(s) Oral before breakfast  polyethylene glycol 3350 17 Gram(s) Oral at bedtime  senna 2 Tablet(s) Oral at bedtime    MEDICATIONS  (PRN):  aluminum hydroxide/magnesium hydroxide/simethicone Suspension 30 milliLiter(s) Oral four times a day PRN Indigestion  bisacodyl Suppository 10 milliGRAM(s) Rectal once PRN Constipation  HYDROmorphone  Injectable 0.5 milliGRAM(s) IV Push every 3 hours PRN Breakthrough pain  magnesium hydroxide Suspension 30 milliLiter(s) Oral daily PRN Constipation  ondansetron Injectable 4 milliGRAM(s) IV Push every 6 hours PRN Nausea and/or Vomiting  oxyCODONE    IR 5 milliGRAM(s) Oral every 3 hours PRN Moderate Pain (4 - 6)  oxyCODONE    IR 10 milliGRAM(s) Oral every 3 hours PRN Severe Pain (7 - 10)      Allergies  pollen (Rhinitis)  No Known Drug Allergies  metals, costume jewelry (Hives)    REVIEW OF SYSTEMS:  CONSTITUTIONAL: No fever, weight loss, or fatigue  EYES: No eye pain, visual disturbances, or discharge  ENMT:  No difficulty hearing, tinnitus, vertigo; No sinus or throat pain  NECK: No pain or stiffness  BREASTS: No pain, masses, or nipple discharge  RESPIRATORY: No cough, wheezing, chills or hemoptysis; No shortness of breath  CARDIOVASCULAR: No chest pain, palpitations, or lightheadedness  GASTROINTESTINAL: No abdominal or epigastric pain. No nausea, vomiting, or hematemesis; No diarrhea or constipation. No melena or hematochezia.  GENITOURINARY: No dysuria, frequency, hematuria, or incontinence  NEUROLOGICAL: No headaches, vertigo, memory loss, loss of strength, numbness, or tremors  SKIN: No itching, burning, rashes, or lesions   LYMPH NODES: No enlarged glands  ENDOCRINE: No heat or cold intolerance; No hair loss; No polydipsia or polyuria  MUSCULOSKELETAL: No back pain  PSYCHIATRIC: No depression, anxiety, or mood swings  HEME/LYMPH: No easy bruising, or bleeding gums  ALLERGY AND IMMUNOLOGIC: No hives or eczema    Vital Signs Last 24 Hrs  T(C): 36.9 (24 Jan 2025 08:11), Max: 37 (23 Jan 2025 15:35)  T(F): 98.5 (24 Jan 2025 08:11), Max: 98.6 (23 Jan 2025 15:35)  HR: 68 (24 Jan 2025 08:11) (53 - 77)  BP: 119/65 (24 Jan 2025 08:11) (119/65 - 144/61)  BP(mean): --  RR: 16 (24 Jan 2025 08:11) (15 - 16)  SpO2: 98% (24 Jan 2025 08:11) (98% - 100%)    Parameters below as of 24 Jan 2025 08:11  Patient On (Oxygen Delivery Method): room air    PHYSICAL EXAM:  GENERAL: NAD, well-groomed, well-developed  HEAD:  Atraumatic, Normocephalic  EYES:  conjunctiva and sclera clear  ENMT: Moist mucous membranes  NECK: Supple, No JVD  NERVOUS SYSTEM:  Alert & Oriented X3, Good concentration; Bilateral LE mobile, sensation to light touch intact  CHEST/LUNG: Clear to auscultation bilaterally; No rales, rhonchi, wheezing, or rubs  HEART: Regular rate and rhythm; No murmurs, rubs, or gallops  ABDOMEN: Soft, Nontender, Nondistended; Bowel sounds present  EXTREMITIES:  2+ Peripheral Pulses, No clubbing or cyanosis  LYMPH: No lymphadenopathy noted  SKIN: No rashes or lesions  INCISION:  Dressing dry and intact    LABS:                        10.0   9.15  )-----------( 132      ( 24 Jan 2025 06:30 )             30.8     24 Jan 2025 06:30    138    |  106    |  18     ----------------------------<  100    4.4     |  24     |  1.08     Ca    8.7        24 Jan 2025 06:30        Urinalysis Basic - ( 24 Jan 2025 06:30 )    Color: x / Appearance: x / SG: x / pH: x  Gluc: 100 mg/dL / Ketone: x  / Bili: x / Urobili: x   Blood: x / Protein: x / Nitrite: x   Leuk Esterase: x / RBC: x / WBC x   Sq Epi: x / Non Sq Epi: x / Bacteria: x      CAPILLARY BLOOD GLUCOSE          RADIOLOGY & ADDITIONAL TESTS:    Imaging Personally Reviewed:      [ ] Consultant(s) Notes Reviewed  [x] Care Discussed with Consultants/Other Providers:  Ortho PA- plan of care

## 2025-01-24 NOTE — PATIENT CHOICE NOTE. - NSPTCHOICESTATE_GEN_ALL_CORE

## 2025-01-24 NOTE — PATIENT CHOICE NOTE. - NSPTCHOICENOTES_GEN_ALL_CORE
TRANSITION OF CARE PLAN UPDATE:  [facility] unable to accept patient r/t to no staff in service area; patient was accepted by [facility]; patient in agreement with receiving home care with [facility];

## 2025-01-28 ENCOUNTER — APPOINTMENT (OUTPATIENT)
Dept: ORTHOPEDIC SURGERY | Facility: CLINIC | Age: 75
End: 2025-01-28

## 2025-01-29 ENCOUNTER — NON-APPOINTMENT (OUTPATIENT)
Age: 75
End: 2025-01-29

## 2025-02-06 ENCOUNTER — APPOINTMENT (OUTPATIENT)
Dept: ORTHOPEDIC SURGERY | Facility: CLINIC | Age: 75
End: 2025-02-06
Payer: MEDICARE

## 2025-02-06 DIAGNOSIS — Z96.642 PRESENCE OF LEFT ARTIFICIAL HIP JOINT: ICD-10-CM

## 2025-02-06 PROCEDURE — 99024 POSTOP FOLLOW-UP VISIT: CPT

## 2025-02-06 PROCEDURE — 73502 X-RAY EXAM HIP UNI 2-3 VIEWS: CPT

## 2025-02-26 ENCOUNTER — NON-APPOINTMENT (OUTPATIENT)
Age: 75
End: 2025-02-26

## 2025-04-22 ENCOUNTER — NON-APPOINTMENT (OUTPATIENT)
Age: 75
End: 2025-04-22

## 2025-05-13 ENCOUNTER — APPOINTMENT (OUTPATIENT)
Dept: ORTHOPEDIC SURGERY | Facility: CLINIC | Age: 75
End: 2025-05-13

## 2025-05-13 DIAGNOSIS — Z96.642 PRESENCE OF LEFT ARTIFICIAL HIP JOINT: ICD-10-CM

## 2025-05-13 PROCEDURE — G2211 COMPLEX E/M VISIT ADD ON: CPT

## 2025-05-13 PROCEDURE — 73502 X-RAY EXAM HIP UNI 2-3 VIEWS: CPT

## 2025-05-13 PROCEDURE — 99214 OFFICE O/P EST MOD 30 MIN: CPT

## 2025-05-13 RX ORDER — MELOXICAM 15 MG/1
15 TABLET ORAL
Qty: 30 | Refills: 2 | Status: ACTIVE | COMMUNITY
Start: 2025-05-13 | End: 1900-01-01

## 2025-08-05 ENCOUNTER — NON-APPOINTMENT (OUTPATIENT)
Age: 75
End: 2025-08-05

## (undated) DEVICE — GOWN IMPERV BREATHABLE XL

## (undated) DEVICE — DRSG STOCKINETTE CLOTH 6 X 72"

## (undated) DEVICE — HOOD T5 PEELAWAY

## (undated) DEVICE — ELCTR BOVIE TIP BLADE INSULATED 6.5" EDGE

## (undated) DEVICE — PACK TOTAL HIP

## (undated) DEVICE — SUT STRATAFIX SPIRAL MONOCRYL PLUS 4-0 30CM PS-2 UNDYED

## (undated) DEVICE — Device

## (undated) DEVICE — SUT VICRYL 3-0 36" CT-1

## (undated) DEVICE — DRAPE 1/2 SHEET 40X57"

## (undated) DEVICE — SAW BLADE STRYKER WIDE MED 25MM X 73MM X 0.89MM

## (undated) DEVICE — ZIMMER PULSAVAC PLUS FAN KIT

## (undated) DEVICE — PLASTIC SOLUTION BOWL 160Z

## (undated) DEVICE — DRAPE 3/4 SHEET 52X76"

## (undated) DEVICE — DRAPE IOBAN 33" X 23"

## (undated) DEVICE — BLADE SURGICAL #11 CARBON

## (undated) DEVICE — DRSG PICO NPWT 4X12"

## (undated) DEVICE — NDL HYPO SAFE 20G X 1.5" (YELLOW)

## (undated) DEVICE — HOOD FLYTE STRYKER HELMET SHIELD

## (undated) DEVICE — SUT MONOCRYL 3-0 27" PS-2 UNDYED

## (undated) DEVICE — SUT VICRYL 1 36" CTX UNDYED

## (undated) DEVICE — DRAPE STERI-DRAPE INCISE 32X33"

## (undated) DEVICE — ONETRAC LIGHTED RETRACTOR LXS CROWN TIP DISP

## (undated) DEVICE — SOL IRR BAG NS 0.9% 3000ML

## (undated) DEVICE — KIT PROCEDURE HIP LATERAL PLATFORM

## (undated) DEVICE — SUT ETHIBOND 5 4-30" V-37

## (undated) DEVICE — SYR LUER LOK 20CC

## (undated) DEVICE — POSITIONER FOAM ABDUCTION PILLOW MED (PINK)

## (undated) DEVICE — DRSG MEPILEX 10 X 10CM (4 X 4") AG

## (undated) DEVICE — ELCTR STRYKER NEPTUNE SMOKE EVACUATION PENCIL (GREEN)

## (undated) DEVICE — SUT VICRYL 2-0 36" CT-1 UNDYED

## (undated) DEVICE — PREP CHLORAPREP HI-LITE ORANGE 26ML

## (undated) DEVICE — DRAPE GYN IRRIGATION POUCH 19 X 23"

## (undated) DEVICE — DRAPE HIP W POUCHES 87X115X134"